# Patient Record
Sex: FEMALE | Race: WHITE | NOT HISPANIC OR LATINO | Employment: UNEMPLOYED | URBAN - METROPOLITAN AREA
[De-identification: names, ages, dates, MRNs, and addresses within clinical notes are randomized per-mention and may not be internally consistent; named-entity substitution may affect disease eponyms.]

---

## 2017-01-17 ENCOUNTER — TELEPHONE (OUTPATIENT)
Dept: PEDIATRICS | Facility: CLINIC | Age: 22
End: 2017-01-17

## 2017-01-17 NOTE — TELEPHONE ENCOUNTER
----- Message from Margaret La sent at 1/17/2017  1:42 PM CST -----  Contact: mom suman [phone] or [phone]  Call mom she needs to get the referral for neurology faxed to the office.      Returned moms call, and faxed referral to [facility].

## 2018-02-17 ENCOUNTER — HOSPITAL ENCOUNTER (INPATIENT)
Facility: HOSPITAL | Age: 23
LOS: 4 days | Discharge: HOME | DRG: 882 | End: 2018-02-21
Attending: PSYCHIATRY & NEUROLOGY | Admitting: PSYCHIATRY & NEUROLOGY
Payer: MEDICAID

## 2018-02-17 DIAGNOSIS — F42.9 OCD (OBSESSIVE COMPULSIVE DISORDER): ICD-10-CM

## 2018-02-17 DIAGNOSIS — F41.9 ANXIETY: ICD-10-CM

## 2018-02-17 DIAGNOSIS — F42.2 MIXED OBSESSIONAL THOUGHTS AND ACTS: ICD-10-CM

## 2018-02-17 PROCEDURE — 99222 1ST HOSP IP/OBS MODERATE 55: CPT | Mod: AF,HB,, | Performed by: PSYCHIATRY & NEUROLOGY

## 2018-02-17 PROCEDURE — 25000003 PHARM REV CODE 250: Performed by: PSYCHIATRY & NEUROLOGY

## 2018-02-17 PROCEDURE — 12400001 HC PSYCH SEMI-PRIVATE ROOM

## 2018-02-17 RX ORDER — CALCIUM CARBONATE 200(500)MG
500 TABLET,CHEWABLE ORAL DAILY PRN
Status: DISCONTINUED | OUTPATIENT
Start: 2018-02-17 | End: 2018-02-21 | Stop reason: HOSPADM

## 2018-02-17 RX ORDER — CLONAZEPAM 0.5 MG/1
0.5 TABLET ORAL 2 TIMES DAILY
COMMUNITY

## 2018-02-17 RX ORDER — HYDROXYZINE PAMOATE 50 MG/1
50 CAPSULE ORAL EVERY 6 HOURS PRN
Status: DISCONTINUED | OUTPATIENT
Start: 2018-02-17 | End: 2018-02-17

## 2018-02-17 RX ORDER — FLUVOXAMINE MALEATE 50 MG/1
50 TABLET ORAL 2 TIMES DAILY
Status: DISCONTINUED | OUTPATIENT
Start: 2018-02-17 | End: 2018-02-21 | Stop reason: HOSPADM

## 2018-02-17 RX ORDER — FLUVOXAMINE MALEATE 50 MG/1
50 TABLET ORAL NIGHTLY
Status: DISCONTINUED | OUTPATIENT
Start: 2018-02-17 | End: 2018-02-17

## 2018-02-17 RX ORDER — FLUVOXAMINE MALEATE 50 MG/1
50 TABLET ORAL NIGHTLY
Status: ON HOLD | COMMUNITY
End: 2018-02-21 | Stop reason: HOSPADM

## 2018-02-17 RX ORDER — CLONAZEPAM 0.5 MG/1
0.5 TABLET ORAL 2 TIMES DAILY
Status: DISCONTINUED | OUTPATIENT
Start: 2018-02-17 | End: 2018-02-21 | Stop reason: HOSPADM

## 2018-02-17 RX ORDER — OLANZAPINE 5 MG/1
5 TABLET, ORALLY DISINTEGRATING ORAL EVERY 8 HOURS PRN
Status: DISCONTINUED | OUTPATIENT
Start: 2018-02-17 | End: 2018-02-17

## 2018-02-17 RX ORDER — IBUPROFEN 600 MG/1
600 TABLET ORAL EVERY 6 HOURS PRN
Status: DISCONTINUED | OUTPATIENT
Start: 2018-02-17 | End: 2018-02-21 | Stop reason: HOSPADM

## 2018-02-17 RX ORDER — RISPERIDONE 0.25 MG/1
0.25 TABLET ORAL NIGHTLY
Status: DISCONTINUED | OUTPATIENT
Start: 2018-02-17 | End: 2018-02-18

## 2018-02-17 RX ORDER — OLANZAPINE 10 MG/2ML
5 INJECTION, POWDER, FOR SOLUTION INTRAMUSCULAR EVERY 8 HOURS PRN
Status: DISCONTINUED | OUTPATIENT
Start: 2018-02-17 | End: 2018-02-17

## 2018-02-17 RX ORDER — LORAZEPAM 0.5 MG/1
0.5 TABLET ORAL EVERY 12 HOURS PRN
Status: DISCONTINUED | OUTPATIENT
Start: 2018-02-17 | End: 2018-02-21 | Stop reason: HOSPADM

## 2018-02-17 RX ADMIN — RISPERIDONE 0.25 MG: 0.25 TABLET, FILM COATED ORAL at 08:02

## 2018-02-17 RX ADMIN — CLONAZEPAM 0.5 MG: 0.5 TABLET ORAL at 12:02

## 2018-02-17 RX ADMIN — FLUVOXAMINE MALEATE 50 MG: 50 TABLET, FILM COATED ORAL at 12:02

## 2018-02-17 RX ADMIN — FLUVOXAMINE MALEATE 50 MG: 50 TABLET, FILM COATED ORAL at 08:02

## 2018-02-17 RX ADMIN — CLONAZEPAM 0.5 MG: 0.5 TABLET ORAL at 08:02

## 2018-02-17 NOTE — NURSING
"Patient admitted to Acute Psychiatric Unit on a PEC. Arrived per [facility] from [facility] at approximately 0150. Per PEC, patient presented to emergency department with increased symptoms of anxiety and depression. Has been refusing to leave bed or eat and "having accidents on herself." Calm, cooperative on assessment. Verbalizes feeling OCD is getting worse and medications are not working. Person and belongings searched for contraband per NICKI Snider RN, CARMEN Granados RN and MIREYA Oshea MARYCARMEN. Educated on plan of care, unit rules, policies and procedures. Verbalized understanding. Modified visual contact initiated.   "

## 2018-02-17 NOTE — SUBJECTIVE & OBJECTIVE
Patient History           Medical as of 2/17/2018    Past Medical History: Patient provided no pertinent medical history.           Surgical as of 2/17/2018    Past Surgical History: Patient provided no pertinent surgical history.           Family as of 2/17/2018     Problem Relation Name Age of Onset Comments Source    Diabetes Mother -- -- -- Provider    Hypertension Father -- -- -- Provider    Migraines Father -- -- -- Provider    Seizures Maternal Grandmother -- -- -- Provider    Diabetes Paternal Grandfather -- -- -- Provider            Tobacco Use as of 2/17/2018     Smoking Status Smoking Start Date Smoking Quit Date Packs/day Years Used    Never Smoker -- -- -- --    Types Comments Smokeless Tobacco Status Smokeless Tobacco Quit Date Source    -- -- Unknown -- Provider            Alcohol Use as of 2/17/2018     Alcohol Use Drinks/Week Alcohol/Week Comments Source    No -- -- -- Provider            Drug Use as of 2/17/2018     Drug Use Types Frequency Comments Source    No -- -- -- Provider            Sexual Activity as of 2/17/2018     Sexually Active Birth Control Partners Comments Source    No -- -- -- Provider            Activities of Daily Living as of 2/17/2018    **None**           Social Documentation as of 2/17/2018    **None**           Occupational as of 2/17/2018    **None**           Socioeconomic as of 2/17/2018     Marital Status Spouse Name Number of Children Years Education Preferred Language Ethnicity Race Source    Single -- -- -- English /White White --         Pertinent History Q A Comments    as of 2/17/2018 Lives with      Place in Birth Order      Lives in      Number of Siblings      Raised by      Legal Involvement      Childhood Trauma      Criminal History of      Financial Status      Highest Level of Education      Does patient have access to a firearm?       Service      Primary Leisure Activity      Spirituality       History reviewed. No pertinent past  medical history.  History reviewed. No pertinent surgical history.  Family History     Problem Relation (Age of Onset)    Diabetes Mother, Paternal Grandfather    Hypertension Father    Migraines Father    Seizures Maternal Grandmother        Social History Main Topics    Smoking status: Never Smoker    Smokeless tobacco: Not on file    Alcohol use No    Drug use: No    Sexual activity: No     Review of patient's allergies indicates:  No Known Allergies    No current facility-administered medications on file prior to encounter.      Current Outpatient Prescriptions on File Prior to Encounter   Medication Sig    [DISCONTINUED] loratadine (CLARITIN) 10 mg tablet Take 1 tablet (10 mg total) by mouth once daily.     Psychotherapeutics     Start     Stop Route Frequency Ordered    02/17/18 2100  risperiDONE tablet 0.25 mg      -- Oral Nightly 02/17/18 1050    02/17/18 1200  fluvoxaMINE tablet 50 mg      -- Oral 2 times daily 02/17/18 1048    02/17/18 1152  LORazepam tablet 0.5 mg      -- Oral Every 12 hours PRN 02/17/18 1053        Review of Systems  Strengths and Liabilities: Strength: Patient has positive support network.    Objective:     Vital Signs (Most Recent):  Temp: 98.2 °F (36.8 °C) (02/17/18 0800)  Pulse: 86 (02/17/18 0800)  Resp: 18 (02/17/18 0800)  BP: 122/67 (02/17/18 0800) Vital Signs (24h Range):  Temp:  [98.2 °F (36.8 °C)-99 °F (37.2 °C)] 98.2 °F (36.8 °C)  Pulse:  [86-89] 86  Resp:  [16-18] 18  BP: (122-140)/(67-78) 122/67     Height: 5' (152.4 cm)  Weight: 66.4 kg (146 lb 6.2 oz)  Body mass index is 28.59 kg/m².    No intake or output data in the 24 hours ending 02/17/18 1756    Physical Exam    General: NAD  HEENT: EOMI  Respiratory: CTAB, unlabored breathing  Cardiovascular: RRR, no murmurs  Abdominal: abdomen soft, non-tender, non-distended  Extremities: no cyanosis or clubbing  Neurological: no involuntary movements observed, no focal neurological deficits, cranial nerves wnl:  I: No deficit  "smell  II: Visual fields full to confrontation  III,IV,VI: PERRLA, EOMI. No nystagmus   V: sensation to light touch intact throughout face  VII: Symmetric smile and brow raise  VIII: Able to hear finger rub bilaterally   IX,X: Symmetric elevation of the soft palate   XI: Able to shrug shoulders bilaterally   XII: tongue midline on protrusion    MSE  Level of Consciousness: alert, awake  Orientation: person, place, situation, time  Grooming: good  Psychomotor Behavior: no abnormalities noted  Speech: spontaneous, quiet, soft, normal rate  Language: fluent English  Mood: "scared"  Affect: mood congruent, anxious, child like  Thought Process: linear, goal directed  Associations: no loosening noted  Thought Content: denies SI/HI/AVH  Memory: remote and recent intact  Attention: attends to interview without distraction  Fund of Knowledge: appropriate for education level  Insight: fair  Judgment: appropriate for setting    Significant Labs:   Last 24 Hours:   Recent Lab Results     None      Labs from OSH reviewed and wnl; utox and urine pregnancy test negative    Significant Imaging: None  "

## 2018-02-17 NOTE — HPI
"Patient is a 23 y/o woman with past psychiatric h/o OCD who presented as a transfer from [facility] on 2/17/18. Per report, mother brought patient to the hospital as patient has been anxious to the point of not being able to leave the bed even to use the bathroom. Patient affirms this and says "I can't do anything..it's getting worse, my medicine isn't working." Says her issues are primary with germs, takes 1-2 showers/day and washes her hands a lot. H/o excessive checking behaviors when she was younger that she says have improved, denies repetitive thoughts or behaviors. Endorses increased depression, increased anxiety (including agoraphobia and social phobia), decreased appetite (1-2 meals/day) and poor sleep. Denies SI, h/o SA, key, panic attacks or AVH. Reluctant to discuss stressors, asks "can my mom tell you?" Alludes to some conflict in the home. Sees a psychiatrist/therapist monthly and journals regularly, also has a worker who comes to the home twice weekly. Denies prior psychiatric hospitalizations. Patient unsure of home medications.     Collateral: obtained by CM Julie Haase from patient's mother, Azucena (c:[phone] h:[phone]).   Mother reports that patient takes Clonazepam 0.5mg twice a day and Flovoxamine Maleate 50 mg daily. Mother has been making patient take medication so she has been compliant for at least 30 days.Mother states that patient was a preemie (6 weeks early) and has always been delayed. Reports no medical history other than severe migraines an a child. She has a long history of anxiety and fear of new situations. Has always been shy and suspicious of people and does not have friends. Since October of 2016, mother reports patient has been having "meltdowns."  She states that patient will sometimes sit in her room, not eat, and not talk other than to tell her mother that "things in her head keep repeating."  Mother states that patient has gotten so frustrated with this " "that she has screamed and punched herself in the leg. Mother emphasizes that this is not a self harming behavior. She has never been violent towards anyone.  She will cry.  She reported that patient will  her closet for hours.  The patient believes bad things will happen to people if she doesn't perform a certain ritual (explaine: touch door frame 3 times.) Sometimes mother has to bathe her because patient gets worried about germs and will ask mother over and over again if an objected  touched something  before it touched her (pt)  because she is worried "something bad will happen."  When she gets so focused on these types of questions, she can't perform her ADLs. She has these episodes almost daily and sometimes twice daily. She "really can't remember the last time she was doing well." Mother thinks patient's stressors are finishing high school and not knowing what to do. She does not do well with change. Mother also reports lots of stress in the home. Mother and father live in the home, along with patient's 82 year old grandfather who has dementia, three younger nieces, and brother and nephew live there off and on. She states her and the patient's father have been fighting and that he is a "jerk." Father has some understanding of patient's illness but will get angry with her when she has an episode. Patient is followed by a , Lindsay, at [facility] in [place] ([phone]). She was seeing a doctor there but mother states that patient wouldn't talk to him. She saw a nurse practitoner once. She is followed by ACT team (Keila) who comes to the house twice a week and a Ms. Snider who comes monthly for psychotherapy to work with patient on her anxiety. They have been following Dana for about 6-8 months. Mother suggested that we call Lindsay for more info about her psychiatric care. She has no previous inpatient admissions. Per mother, pt is very attached to her and dependant on " her. She said she would be available for a family meeting. She is supportive of patient. She asked about visiting hours and if she could bring the patient her stuffed animal and pillow. She also wanted to know if she should come visit at all because she was afraid that patient would have a bad meltdown when she left. Discussed above with treatment team and charge nurse. Per unit policy, patient will not be able to have her stuffed animal or pillow. Treatment teams feels patient may benefit more from a visit tomorrow after she's had a day to adjust to the unit and suggested that mom wait until Sunday to visit.     Past Psychiatric History:  Previous Medication Trials: pt unsure  Previous Psychiatric Hospitalizations: no   Previous Suicide Attempts: no   History of Violence: no  Outpatient Psychiatrist: ACT team Dr Sndier, counselor Lindsay    Social History:  Marital Status: single  Children: 0   Employment Status/Info: unemployed, helps out at home  Education: high school diploma/GED  Special Ed: yes  : did not assess  Spiritism: did not assess  Housing Status: lives with Christelle with dementia, mom, dad, 3 cousins (ages 6, 8, 9), brother and occasionally brother's young son  Hobbies/Leisure time: baking, playing games  History of phys/sexual abuse: did not assess; denies h/o trauma  Access to gun: no    Family Psychiatric History:   Family history of anxiety in [facility].     Substance Abuse History:  Recreational Drugs: no  Use of Alcohol: denied  Rehab History:no   Tobacco Use:no    Legal History:  Past Charges/Incarcerations:no   Pending charges:no     Neuro History:  H/o migraines, does not currently take meds. No h/o concussions, seizures.

## 2018-02-17 NOTE — NURSING
"Collateral obtained from patient's mother, Azucena (c:[phone] h:[phone]).    Mother reports that patient takes Clonazepam 0.5mg twice a day and Flovoxamine Maleate 50 mg daily. Mother has been making patient take medication so she has been compliant for at least 30 days.    Mother states that patient was a preemie (6 weeks early) and has always been delayed. Reports no medical history other than severe migraines an a child. She has a long history of anxiety and fear of new situations. Has always been shy and suspicious of people and does not have friends.     Since October of 2016, mother reports patient has been having "meltdowns."  She states that patient will sometimes sit in her room, not eat, and not talk other than to tell her mother that "things in her head keep repeating."  Mother states that patient has gotten so frustrated with this that she has screamed and punched herself in the leg. Mother emphasizes that this is not a self harming behavior. She has never been violent towards anyone.  She will cry.  She reported that patient will  her closet for hours.  The patient believes bad things will happen to people if she doesn't perform a certain ritual (explaine: touch door frame 3 times.) Sometimes mother has to bathe her because patient gets worried about germs and will ask mother over and over again if an objected  touched something  before it touched her (pt)  because she is worried "something bad will happen."  When she gets so focused on these types of questions, she can't perform her ADLs. She has these episodes almost daily and sometimes twice daily. She "really can't remember the last time she was doing well."    Mother thinks patient's stressors are finishing high school and not knowing what to do. She does not do well with change. Mother also reports lots of stress in the home. Mother and father live in the home, along with patient's 82 year old grandfather who has dementia, three " "younger nieces, and brother and nephew live there off and on. She states her and the patient's father have been fighting and that he is a "jerk." Father has some understanding of patient's illness but will get angry with her when she has an episode.    Patient is followed by a , Lindsay, at [facility] in [place] ([phone]). She was seeing a doctor there but mother states that patient wouldn't talk to him. She saw a nurse practitoner once. She is followed by ACT team (Keila) who comes to the house twice a week and a Ms. Snider who comes monthly for psychotherapy to work with patient on her anxiety. They have been following Dana for about 6-8 months. Mother suggested that we call Lindsay for more info about her psychiatric care. She has no previous inpatient admissions.    Per mother, pt is very attached to her and dependant on her. She said she would be available for a family meeting. She is supportive of patient. She asked about visiting hours and if she could bring the patient her stuffed animal and pillow. She also wanted to know if she should come visit at all because she was afraid that patient would have a bad meltdown when she left. Discussed above with treatment team and charge nurse. Per unit policy, patient will not be able to have her stuffed animal or pillow. Treatment teams feels patient may benefit more from a visit tomorrow after she's had a day to adjust to the unit and suggested that mom wait until Sunday to visit.   "

## 2018-02-17 NOTE — PROGRESS NOTES
02/17/18 0900 02/17/18 1000 02/17/18 1100   [facility] Group Therapy   Group Name Community Reintegration Mental Awareness Stress Management   Specific Interventions Current Events Cognitive Stimulation Training Guided Imagery/Relaxation   Participation Level None None None   Participation Quality Refused Refused Refused       02/17/18 1300 02/17/18 1330   [facility]   Group Name Medication Therapeutic Recreation   Specific Interventions --  (board games)   Participation Level None None   Participation Quality Refused Refused

## 2018-02-17 NOTE — PLAN OF CARE
02/17/18 1300   [facility] Group Therapy   Group Name Medication   Participation Level None   Participation Quality Refused

## 2018-02-18 PROCEDURE — 25000003 PHARM REV CODE 250: Performed by: PSYCHIATRY & NEUROLOGY

## 2018-02-18 PROCEDURE — 12400001 HC PSYCH SEMI-PRIVATE ROOM

## 2018-02-18 PROCEDURE — 99232 SBSQ HOSP IP/OBS MODERATE 35: CPT | Mod: AF,HB,, | Performed by: PSYCHIATRY & NEUROLOGY

## 2018-02-18 RX ORDER — RISPERIDONE 0.5 MG/1
0.5 TABLET ORAL NIGHTLY
Status: DISCONTINUED | OUTPATIENT
Start: 2018-02-18 | End: 2018-02-20

## 2018-02-18 RX ADMIN — FLUVOXAMINE MALEATE 50 MG: 50 TABLET, FILM COATED ORAL at 08:02

## 2018-02-18 RX ADMIN — CLONAZEPAM 0.5 MG: 0.5 TABLET ORAL at 08:02

## 2018-02-18 RX ADMIN — RISPERIDONE 0.5 MG: 0.5 TABLET ORAL at 08:02

## 2018-02-18 NOTE — PROGRESS NOTES
Staff report no ritualistic or compulsive behavior.  Staff observed patient not wash her hands before eating her pizza lunch.  Staff report patient talks more with female staff.

## 2018-02-18 NOTE — SUBJECTIVE & OBJECTIVE
"Interval History: Patient minimally cooperative with the interview.  Appeared very anxious.  States "I don't like being around a lot of people.  I want to be at home."  Not participating in groups. Childlike in her speech and responses.  Did not eat today.  States she doesn't want to eat around people she doesn't know.  Agreeable to drink gatorade.     Family History     Problem Relation (Age of Onset)    Diabetes Mother, Paternal Grandfather    Hypertension Father    Migraines Father    Seizures Maternal Grandmother        Social History Main Topics    Smoking status: Never Smoker    Smokeless tobacco: Not on file    Alcohol use No    Drug use: No    Sexual activity: No     Psychotherapeutics     Start     Stop Route Frequency Ordered    02/17/18 2100  risperiDONE tablet 0.25 mg      -- Oral Nightly 02/17/18 1050    02/17/18 1200  fluvoxaMINE tablet 50 mg      -- Oral 2 times daily 02/17/18 1048    02/17/18 1152  LORazepam tablet 0.5 mg      -- Oral Every 12 hours PRN 02/17/18 1053           Review of Systems  Objective:     Vital Signs (Most Recent):  Temp: 99 °F (37.2 °C) (02/18/18 0730)  Pulse: 84 (02/18/18 0730)  Resp: 16 (02/18/18 0730)  BP: 122/63 (02/18/18 0730) Vital Signs (24h Range):  Temp:  [99 °F (37.2 °C)-99.3 °F (37.4 °C)] 99 °F (37.2 °C)  Pulse:  [68-84] 84  Resp:  [16-18] 16  BP: (122-128)/(63-79) 122/63     Height: 5' (152.4 cm)  Weight: 66.4 kg (146 lb 6.2 oz)  Body mass index is 28.59 kg/m².    No intake or output data in the 24 hours ending 02/18/18 1053    Physical Exam      Mental Status Exam:  Appearance: normal weight, younger than stated age, casually dressed  Behavior/Cooperation:  reluctant to participate, restless and fidgety , eye contact minimal  Speech:  increased latency of response, soft  Language: uses words appropriately; NO aphasia or dysarthria  Mood: anxious  Affect:  congruent with mood and appropriate to situation/content   Thought Process: normal and logical  Thought " Content: normal, no suicidality, no homicidality, delusions, or paranoia  Level of Consciousness: Alert and Oriented x3  Memory:  Grossly Intact  Attention/concentration: appropriate for age/education.   Fund of Knowledge: appears adequate  Insight: Limited  Judgment: Limited    Significant Labs:   Last 24 Hours:   Recent Lab Results     None          Significant Imaging: None

## 2018-02-18 NOTE — PLAN OF CARE
Pt isolative in room this shift. Visibly anxious with intermittent crying spells. Pt did not attend groups or engage with peers. Maximum encouragement and reassurance provided in attempt to establish therapeutic rapport. Pt did comply with scheduled medications and education provided. Plan of care reviewed with pt. Will continue to provide safe therapeutic environment as pt works towards treatment goals.

## 2018-02-18 NOTE — PLAN OF CARE
Problem: Patient Care Overview (Adult)  Goal: Plan of Care Review  Outcome: Ongoing (interventions implemented as appropriate)  Understands need to be in hospital and take medication. Cooperative with medication administration.  Goal: Interdisciplinary Rounds/Family Conf  Outcome: Ongoing (interventions implemented as appropriate)  Patient participated with interdisciplinary rounds.    Problem: Mood Impairment (Anxiety Signs/Symptoms) (Adult)  Goal: Improved Mood Symptoms  Isolating in room most of day with minimal interaction with others.    Problem: Mood Impairment (Depressive Signs/Symptoms) (Adult)  Goal: Improved Mood Symptoms  Outcome: Ongoing (interventions implemented as appropriate)  Patient refusing to speak with her nurse.  Patient does not attend unit activities.    Problem: Behavior Regulation (Impulse Control) Impairment (Obsessive-Compulsive Symptoms) (Adult,Pediatric)  Goal: Improved Behavior Regulation and Impulse Control (Obsessive-Compulsive Symptoms)  Outcome: Ongoing (interventions implemented as appropriate)  No compulsive or ritualistic behavior reported.    Comments: Understands need to be in hospital and take medication. Cooperative with medication administration.  Patient participated with interdisciplinary rounds.  Isolating in room most of day with minimal interaction with others.  Patient refusing to speak with her nurse.  Patient does not attend unit activities.  No compulsive or ritualistic behavior reported.

## 2018-02-18 NOTE — PROGRESS NOTES
Patient keeping to herself in her room.  Patient did not get up for breakfast, but out for morning meds.  Patient only nodded her head when interacting with nurse.  Will continue to monitor.

## 2018-02-18 NOTE — HOSPITAL COURSE
2/18/2018: Isolating on the unit.  Continues to be timid, very anxious.  Not eating, but agreeable to drinking gatorade.   2/19/18 - isolative on unit, constricted, does not appear to be engaging in OCD behavior to great extent.  She is eating and attending to ADLs in hospital.    2/20/18 - remains isolative on unit, frightened and anxious.  PO intake and ADLs both improved.  Compliant with medication regimen.  2/21/18 day of discharge- Patient seen in treatment team this morning. She reports that she is happy that she is going to go home today. She reports that she has not had as many symptoms of OCD on the unit because she is distracted by being in a new place. She has been eating and sleeping on the unit and she took a shower yesterday. Patient was given education of her Luvox and the importance of remaining compliant on the medication after discharge.

## 2018-02-18 NOTE — PROGRESS NOTES
"[facility]  Psychiatry  Progress Note    Patient Name: Dana Storm  MRN: [id number]   Code Status: No Order  Admission Date: 2/17/2018  Hospital Length of Stay: 1 days  Expected Discharge Date:   Attending Physician: Gayathri Crow MD  Primary Care Provider: Jose Aguilar MD    Current Legal Status: [facility]    Patient information was obtained from patient.     Subjective:     Principal Problem:OCD (obsessive compulsive disorder)    Chief Complaint: Anxiety, severe OCD    HPI: Patient is a 23 y/o woman with past psychiatric h/o OCD who presented as a transfer from [facility] on 2/17/18. Per report, mother brought patient to the hospital as patient has been anxious to the point of not being able to leave the bed even to use the bathroom. Patient affirms this and says "I can't do anything..it's getting worse, my medicine isn't working." Says her issues are primary with germs, takes 1-2 showers/day and washes her hands a lot. H/o excessive checking behaviors when she was younger that she says have improved, denies repetitive thoughts or behaviors. Endorses increased depression, increased anxiety (including agoraphobia and social phobia), decreased appetite (1-2 meals/day) and poor sleep. Denies SI, h/o SA, key, panic attacks or AVH. Reluctant to discuss stressors, asks "can my mom tell you?" Alludes to some conflict in the home. Sees a psychiatrist/therapist monthly and journals regularly, also has a worker who comes to the home twice weekly. Denies prior psychiatric hospitalizations. Patient unsure of home medications.     Collateral: obtained by CM Julie Haase from patient's mother, Azucena (c:[phone] h:[phone]).   Mother reports that patient takes Clonazepam 0.5mg twice a day and Flovoxamine Maleate 50 mg daily. Mother has been making patient take medication so she has been compliant for at least 30 days.Mother states that patient was a preemie (6 weeks early) and has always " "been delayed. Reports no medical history other than severe migraines an a child. She has a long history of anxiety and fear of new situations. Has always been shy and suspicious of people and does not have friends. Since October of 2016, mother reports patient has been having "meltdowns."  She states that patient will sometimes sit in her room, not eat, and not talk other than to tell her mother that "things in her head keep repeating."  Mother states that patient has gotten so frustrated with this that she has screamed and punched herself in the leg. Mother emphasizes that this is not a self harming behavior. She has never been violent towards anyone.  She will cry.  She reported that patient will  her closet for hours.  The patient believes bad things will happen to people if she doesn't perform a certain ritual (explaine: touch door frame 3 times.) Sometimes mother has to bathe her because patient gets worried about germs and will ask mother over and over again if an objected  touched something  before it touched her (pt)  because she is worried "something bad will happen."  When she gets so focused on these types of questions, she can't perform her ADLs. She has these episodes almost daily and sometimes twice daily. She "really can't remember the last time she was doing well." Mother thinks patient's stressors are finishing high school and not knowing what to do. She does not do well with change. Mother also reports lots of stress in the home. Mother and father live in the home, along with patient's 82 year old grandfather who has dementia, three younger nieces, and brother and nephew live there off and on. She states her and the patient's father have been fighting and that he is a "jerk." Father has some understanding of patient's illness but will get angry with her when she has an episode. Patient is followed by a , Lindsay, at [facility] in [place] ([phone]). She was " seeing a doctor there but mother states that patient wouldn't talk to him. She saw a nurse practitoner once. She is followed by ACT team (Keila) who comes to the house twice a week and a Ms. Snider who comes monthly for psychotherapy to work with patient on her anxiety. They have been following Dana for about 6-8 months. Mother suggested that we call Lindsay for more info about her psychiatric care. She has no previous inpatient admissions. Per mother, pt is very attached to her and dependant on her. She said she would be available for a family meeting. She is supportive of patient. She asked about visiting hours and if she could bring the patient her stuffed animal and pillow. She also wanted to know if she should come visit at all because she was afraid that patient would have a bad meltdown when she left. Discussed above with treatment team and charge nurse. Per unit policy, patient will not be able to have her stuffed animal or pillow. Treatment teams feels patient may benefit more from a visit tomorrow after she's had a day to adjust to the unit and suggested that mom wait until Sunday to visit.     Past Psychiatric History:  Previous Medication Trials: pt unsure  Previous Psychiatric Hospitalizations: no   Previous Suicide Attempts: no   History of Violence: no  Outpatient Psychiatrist: ACT team Dr Snider, counselor Lindsay    Social History:  Marital Status: single  Children: 0   Employment Status/Info: unemployed, helps out at home  Education: high school diploma/GED  Special Ed: yes  : did not assess  Moravian: did not assess  Housing Status: lives with [facility] with dementia, mom, dad, 3 cousins (ages 6, 8, 9), brother and occasionally brother's young son  Hobbies/Leisure time: baking, playing games  History of phys/sexual abuse: did not assess; denies h/o trauma  Access to gun: no    Family Psychiatric History:   Family history of anxiety in [facility].     Substance Abuse History:  Recreational Drugs:  "no  Use of Alcohol: denied  Rehab History:no   Tobacco Use:no    Legal History:  Past Charges/Incarcerations:no   Pending charges:no     Neuro History:  H/o migraines, does not currently take meds. No h/o concussions, seizures.    Hospital Course: 2/18/2018: Isolating on the unit.  Continues to be timid, very anxious.  Not eating, but agreeable to drinking gatorade.     Interval History: Patient minimally cooperative with the interview.  Appeared very anxious.  States "I don't like being around a lot of people.  I want to be at home."  Not participating in groups. Childlike in her speech and responses.  Did not eat today.  States she doesn't want to eat around people she doesn't know.  Agreeable to drink gatorade.     Family History     Problem Relation (Age of Onset)    Diabetes Mother, Paternal Grandfather    Hypertension Father    Migraines Father    Seizures Maternal Grandmother        Social History Main Topics    Smoking status: Never Smoker    Smokeless tobacco: Not on file    Alcohol use No    Drug use: No    Sexual activity: No     Psychotherapeutics     Start     Stop Route Frequency Ordered    02/17/18 2100  risperiDONE tablet 0.25 mg      -- Oral Nightly 02/17/18 1050    02/17/18 1200  fluvoxaMINE tablet 50 mg      -- Oral 2 times daily 02/17/18 1048    02/17/18 1152  LORazepam tablet 0.5 mg      -- Oral Every 12 hours PRN 02/17/18 1053           Review of Systems  Objective:     Vital Signs (Most Recent):  Temp: 99 °F (37.2 °C) (02/18/18 0730)  Pulse: 84 (02/18/18 0730)  Resp: 16 (02/18/18 0730)  BP: 122/63 (02/18/18 0730) Vital Signs (24h Range):  Temp:  [99 °F (37.2 °C)-99.3 °F (37.4 °C)] 99 °F (37.2 °C)  Pulse:  [68-84] 84  Resp:  [16-18] 16  BP: (122-128)/(63-79) 122/63     Height: 5' (152.4 cm)  Weight: 66.4 kg (146 lb 6.2 oz)  Body mass index is 28.59 kg/m².    No intake or output data in the 24 hours ending 02/18/18 1053    Physical Exam      Mental Status Exam:  Appearance: normal weight, " younger than stated age, casually dressed  Behavior/Cooperation:  reluctant to participate, restless and fidgety , eye contact minimal  Speech:  increased latency of response, soft  Language: uses words appropriately; NO aphasia or dysarthria  Mood: anxious  Affect:  congruent with mood and appropriate to situation/content   Thought Process: normal and logical  Thought Content: normal, no suicidality, no homicidality, delusions, or paranoia  Level of Consciousness: Alert and Oriented x3  Memory:  Grossly Intact  Attention/concentration: appropriate for age/education.   Fund of Knowledge: appears adequate  Insight: Limited  Judgment: Limited    Significant Labs:   Last 24 Hours:   Recent Lab Results     None          Significant Imaging: None    Assessment/Plan:     * OCD (obsessive compulsive disorder)    - Continue PEC for grave disability  - Increase home Luvox from 50 mg qHS to BID  - Continue home Klonopin 0.5 mg daily PRN anxiety, will change to 0.5 mg BID scheduled.  Utox was negative for benzodiazepines despite mother reporting patient receives the medications regularly   - Increase Risperdal to 0.50 mg qHS   - Ativan 0.5 mg BID PRN anxiety  - Consider coordination of care with outpatient therapist/counselor.  Will need to contact [facility] for further collateral.   - Concern that patient is isolating in the bathroom.  Will place modified visual contact order to make sure patient comes out of the bathroom every 20 minutes.         Anxiety    Patient also endorses agoraphobia and social anxiety. See plan for OCD.             Need for Continued Hospitalization:   Requires ongoing hospitalization for stabilization of medications.    Anticipated Disposition: Home or Self Care     Total time:  15 with greater than 50% of this time spent in counseling and/or coordination of care.       Silvana Mcknight MD   Psychiatry  [facility]

## 2018-02-18 NOTE — ASSESSMENT & PLAN NOTE
- Continue PEC for grave disability  - Increase home Luvox from 50 mg qHS to BID  - Continue home Klonopin 0.5 mg daily PRN anxiety, will change to 0.5 mg BID scheduled.  Utox was negative for benzodiazepines despite mother reporting patient receives the medications regularly   - Increase Risperdal to 0.50 mg qHS   - Ativan 0.5 mg BID PRN anxiety  - Consider coordination of care with outpatient therapist/counselor.  Will need to contact [facility] for further collateral.   - Concern that patient is isolating in the bathroom.  Will place modified visual contact order to make sure patient comes out of the bathroom every 20 minutes.

## 2018-02-18 NOTE — H&P
"[facility]  Psychiatry  History & Physical    Patient Name: Dana Storm  MRN: [id number]   Code Status: No Order  Admission Date: 2/17/2018  Attending Physician: Dr Crow  Primary Care Provider: Jose Aguilar MD    Current Legal Status: [facility]    Patient information was obtained from patient, parent and ER records.     Subjective:     Principal Problem:OCD (obsessive compulsive disorder)    Chief Complaint:       HPI: Patient is a 23 y/o woman with past psychiatric h/o OCD who presented as a transfer from [facility] on 2/17/18. Per report, mother brought patient to the hospital as patient has been anxious to the point of not being able to leave the bed even to use the bathroom. Patient affirms this and says "I can't do anything..it's getting worse, my medicine isn't working." Says her issues are primary with germs, takes 1-2 showers/day and washes her hands a lot. H/o excessive checking behaviors when she was younger that she says have improved, denies repetitive thoughts or behaviors. Endorses increased depression, increased anxiety (including agoraphobia and social phobia), decreased appetite (1-2 meals/day) and poor sleep. Denies SI, h/o SA, key, panic attacks or AVH. Reluctant to discuss stressors, asks "can my mom tell you?" Alludes to some conflict in the home. Sees a psychiatrist/therapist monthly and journals regularly, also has a worker who comes to the home twice weekly. Denies prior psychiatric hospitalizations. Patient unsure of home medications.     Collateral: obtained by CM Julie Haase from patient's mother, Azucena (c:[phone] h:[phone]).   Mother reports that patient takes Clonazepam 0.5mg twice a day and Flovoxamine Maleate 50 mg daily. Mother has been making patient take medication so she has been compliant for at least 30 days.Mother states that patient was a preemie (6 weeks early) and has always been delayed. Reports no medical history other than severe " "migraines an a child. She has a long history of anxiety and fear of new situations. Has always been shy and suspicious of people and does not have friends. Since October of 2016, mother reports patient has been having "meltdowns."  She states that patient will sometimes sit in her room, not eat, and not talk other than to tell her mother that "things in her head keep repeating."  Mother states that patient has gotten so frustrated with this that she has screamed and punched herself in the leg. Mother emphasizes that this is not a self harming behavior. She has never been violent towards anyone.  She will cry.  She reported that patient will  her closet for hours.  The patient believes bad things will happen to people if she doesn't perform a certain ritual (explaine: touch door frame 3 times.) Sometimes mother has to bathe her because patient gets worried about germs and will ask mother over and over again if an objected  touched something  before it touched her (pt)  because she is worried "something bad will happen."  When she gets so focused on these types of questions, she can't perform her ADLs. She has these episodes almost daily and sometimes twice daily. She "really can't remember the last time she was doing well." Mother thinks patient's stressors are finishing high school and not knowing what to do. She does not do well with change. Mother also reports lots of stress in the home. Mother and father live in the home, along with patient's 82 year old grandfather who has dementia, three younger nieces, and brother and nephew live there off and on. She states her and the patient's father have been fighting and that he is a "jerk." Father has some understanding of patient's illness but will get angry with her when she has an episode. Patient is followed by a , Lindsay, at [facility] in [place] ([phone]). She was seeing a doctor there but mother states that patient " wouldn't talk to him. She saw a nurse practitoner once. She is followed by ACT team (Keila) who comes to the house twice a week and a Ms. Snider who comes monthly for psychotherapy to work with patient on her anxiety. They have been following Dana for about 6-8 months. Mother suggested that we call Lindsay for more info about her psychiatric care. She has no previous inpatient admissions. Per mother, pt is very attached to her and dependant on her. She said she would be available for a family meeting. She is supportive of patient. She asked about visiting hours and if she could bring the patient her stuffed animal and pillow. She also wanted to know if she should come visit at all because she was afraid that patient would have a bad meltdown when she left. Discussed above with treatment team and charge nurse. Per unit policy, patient will not be able to have her stuffed animal or pillow. Treatment teams feels patient may benefit more from a visit tomorrow after she's had a day to adjust to the unit and suggested that mom wait until Sunday to visit.     Past Psychiatric History:  Previous Medication Trials: pt unsure  Previous Psychiatric Hospitalizations: no   Previous Suicide Attempts: no   History of Violence: no  Outpatient Psychiatrist: ACT team Dr Snider, counselor Lindsay    Social History:  Marital Status: single  Children: 0   Employment Status/Info: unemployed, helps out at home  Education: high school diploma/GED  Special Ed: yes  : did not assess  Islam: did not assess  Housing Status: lives with [facility] with dementia, mom, dad, 3 cousins (ages 6, 8, 9), brother and occasionally brother's young son  Hobbies/Leisure time: baking, playing games  History of phys/sexual abuse: did not assess; denies h/o trauma  Access to gun: no    Family Psychiatric History:   Family history of anxiety in [place].     Substance Abuse History:  Recreational Drugs: no  Use of Alcohol: denied  Rehab History:no   Tobacco  Use:no    Legal History:  Past Charges/Incarcerations:no   Pending charges:no     Neuro History:  H/o migraines, does not currently take meds. No h/o concussions, seizures.         Patient History           Medical as of 2/17/2018    Past Medical History: Patient provided no pertinent medical history.           Surgical as of 2/17/2018    Past Surgical History: Patient provided no pertinent surgical history.           Family as of 2/17/2018     Problem Relation Name Age of Onset Comments Source    Diabetes Mother -- -- -- Provider    Hypertension Father -- -- -- Provider    Migraines Father -- -- -- Provider    Seizures Maternal Grandmother -- -- -- Provider    Diabetes Paternal Grandfather -- -- -- Provider            Tobacco Use as of 2/17/2018     Smoking Status Smoking Start Date Smoking Quit Date Packs/day Years Used    Never Smoker -- -- -- --    Types Comments Smokeless Tobacco Status Smokeless Tobacco Quit Date Source    -- -- Unknown -- Provider            Alcohol Use as of 2/17/2018     Alcohol Use Drinks/Week Alcohol/Week Comments Source    No -- -- -- Provider            Drug Use as of 2/17/2018     Drug Use Types Frequency Comments Source    No -- -- -- Provider            Sexual Activity as of 2/17/2018     Sexually Active Birth Control Partners Comments Source    No -- -- -- Provider            Activities of Daily Living as of 2/17/2018    **None**           Social Documentation as of 2/17/2018    **None**           Occupational as of 2/17/2018    **None**           Socioeconomic as of 2/17/2018     Marital Status Spouse Name Number of Children Years Education Preferred Language Ethnicity Race Source    Single -- -- -- English /White White --         Pertinent History Q A Comments    as of 2/17/2018 Lives with      Place in Birth Order      Lives in      Number of Siblings      Raised by      Legal Involvement      Childhood Trauma      Criminal History of      Financial Status      Highest  Level of Education      Does patient have access to a firearm?       Service      Primary Leisure Activity      Spirituality       History reviewed. No pertinent past medical history.  History reviewed. No pertinent surgical history.  Family History     Problem Relation (Age of Onset)    Diabetes Mother, Paternal Grandfather    Hypertension Father    Migraines Father    Seizures Maternal Grandmother        Social History Main Topics    Smoking status: Never Smoker    Smokeless tobacco: Not on file    Alcohol use No    Drug use: No    Sexual activity: No     Review of patient's allergies indicates:  No Known Allergies    No current facility-administered medications on file prior to encounter.      Current Outpatient Prescriptions on File Prior to Encounter   Medication Sig    [DISCONTINUED] loratadine (CLARITIN) 10 mg tablet Take 1 tablet (10 mg total) by mouth once daily.     Psychotherapeutics     Start     Stop Route Frequency Ordered    02/17/18 2100  risperiDONE tablet 0.25 mg      -- Oral Nightly 02/17/18 1050    02/17/18 1200  fluvoxaMINE tablet 50 mg      -- Oral 2 times daily 02/17/18 1048    02/17/18 1152  LORazepam tablet 0.5 mg      -- Oral Every 12 hours PRN 02/17/18 1053        Review of Systems  Strengths and Liabilities: Strength: Patient has positive support network.    Objective:     Vital Signs (Most Recent):  Temp: 98.2 °F (36.8 °C) (02/17/18 0800)  Pulse: 86 (02/17/18 0800)  Resp: 18 (02/17/18 0800)  BP: 122/67 (02/17/18 0800) Vital Signs (24h Range):  Temp:  [98.2 °F (36.8 °C)-99 °F (37.2 °C)] 98.2 °F (36.8 °C)  Pulse:  [86-89] 86  Resp:  [16-18] 18  BP: (122-140)/(67-78) 122/67     Height: 5' (152.4 cm)  Weight: 66.4 kg (146 lb 6.2 oz)  Body mass index is 28.59 kg/m².    No intake or output data in the 24 hours ending 02/17/18 1756    Physical Exam    General: NAD  HEENT: EOMI  Respiratory: CTAB, unlabored breathing  Cardiovascular: RRR, no murmurs  Abdominal: abdomen soft,  "non-tender, non-distended  Extremities: no cyanosis or clubbing  Neurological: no involuntary movements observed, no focal neurological deficits, cranial nerves wnl:  I: No deficit smell  II: Visual fields full to confrontation  III,IV,VI: PERRLA, EOMI. No nystagmus   V: sensation to light touch intact throughout face  VII: Symmetric smile and brow raise  VIII: Able to hear finger rub bilaterally   IX,X: Symmetric elevation of the soft palate   XI: Able to shrug shoulders bilaterally   XII: tongue midline on protrusion    MSE  Level of Consciousness: alert, awake  Orientation: person, place, situation, time  Grooming: good  Psychomotor Behavior: no abnormalities noted  Speech: spontaneous, quiet, soft, normal rate  Language: fluent English  Mood: "scared"  Affect: mood congruent, anxious, child like  Thought Process: linear, goal directed  Associations: no loosening noted  Thought Content: denies SI/HI/AVH  Memory: remote and recent intact  Attention: attends to interview without distraction  Fund of Knowledge: appropriate for education level  Insight: fair  Judgment: appropriate for setting    Significant Labs:   Last 24 Hours:   Recent Lab Results     None      Labs from OSH reviewed and wnl; utox and urine pregnancy test negative    Significant Imaging: None    Assessment/Plan:     * OCD (obsessive compulsive disorder)    - Continue PEC for grave disability  - Increase home Luvox from 50 mg qHS to BID  - Continue home Klonopin 0.5 mg daily PRN anxiety, will change to 0.5 mg BID scheduled  - Start Risperdal 0.25 mg qHS   - Ativan 0.5 mg BID PRN anxiety  - Consider coordination of care with outpatient therapist/counselor        Anxiety    Patient also endorses agoraphobia and social anxiety. See plan for OCD.           Estimated Discharge Date:   Initial Discharge Plan: Home    Prognosis: Fair    Need for Continued Hospitalization:   Requires ongoing hospitalization for stabilization of medications.    Total Time: " 50 with greater than 50% of time spent in counseling and/or coordination of care.     Concha Mckenzie MD   Psychiatry  [facility]

## 2018-02-18 NOTE — PROGRESS NOTES
02/18/18 0900 02/18/18 1000 02/18/18 1100   [facility] Group Therapy   Group Name Community Reintegration Mental Awareness Stress Management   Specific Interventions Current Events (name game) Guided Imagery/Relaxation   Participation Level None None None   Participation Quality Refused Refused Refused       02/18/18 1200 02/18/18 1330   [facility] Group Therapy   Group Name Medication Therapeutic Recreation   Specific Interventions --  (art room)   Participation Level None None   Participation Quality Refused Refused

## 2018-02-18 NOTE — ASSESSMENT & PLAN NOTE
- Continue PEC for grave disability  - Increase home Luvox from 50 mg qHS to BID  - Continue home Klonopin 0.5 mg daily PRN anxiety, will change to 0.5 mg BID scheduled  - Start Risperdal 0.25 mg qHS   - Ativan 0.5 mg BID PRN anxiety  - Consider coordination of care with outpatient therapist/counselor   Message   call, lyme titer negative     Verified Results  (1) LYME ANTIBODY PROFILE [facility] TO [place] [id number] 09:49AM [facility] Order Number: [id number]     Test Name Result Flag Reference   LYME IGG 0 04  0 00-0 79   NEGATIVE(0 00-0 79)-Absence of detectable Borrelia IgG Antibodies  A negative result does not exclude the possibility of Borrelia infection  If early Lyme disease is suspected,a second sample should be collected & tested 4 weeks after initial testing  LYME IGM 0 31  0 00-0 79   NEGATIVE (0 00-0 79)-Absence of detectable Borrelia IgM antibodies  A negative result does not exclude the possibility of Borrelia infection  If early lyme disease is suspected, a second sample should be collected & tested 4 weeks after initial testing

## 2018-02-18 NOTE — ASSESSMENT & PLAN NOTE
- Continue PEC for grave disability  - Increase home Luvox from 50 mg qHS to BID  - Continue home Klonopin 0.5 mg daily PRN anxiety, will change to 0.5 mg BID scheduled  - Ativan 0.5 mg BID PRN anxiety  - Consider coordination of care with outpatient therapist/counselor

## 2018-02-18 NOTE — PLAN OF CARE
Problem: Patient Care Overview (Adult)  Goal: Plan of Care Review  Outcome: Ongoing (interventions implemented as appropriate)  Observed awake in her room isolative and withdrawn. Denied SI/HI, A/V hallucinattions. Took scheduled medications questioning each one. Educated on the reasons medications were prescribed,along with the dosages. Pt.given opportunity for questions. Appeared asleep throughout the night as noted during frequent rounds. Free from falls/injury. Safety maintained. Continue to monitor.

## 2018-02-18 NOTE — PLAN OF CARE
02/18/18 1200   [facility] Group Therapy   Group Name Medication   Participation Level None   Participation Quality Refused

## 2018-02-19 PROCEDURE — 25000003 PHARM REV CODE 250: Performed by: PSYCHIATRY & NEUROLOGY

## 2018-02-19 PROCEDURE — 90833 PSYTX W PT W E/M 30 MIN: CPT | Mod: AF,HA,, | Performed by: PSYCHIATRY & NEUROLOGY

## 2018-02-19 PROCEDURE — 99232 SBSQ HOSP IP/OBS MODERATE 35: CPT | Mod: AF,HA,, | Performed by: PSYCHIATRY & NEUROLOGY

## 2018-02-19 PROCEDURE — 90853 GROUP PSYCHOTHERAPY: CPT | Mod: HP,HA,, | Performed by: PSYCHOLOGIST

## 2018-02-19 PROCEDURE — 12400001 HC PSYCH SEMI-PRIVATE ROOM

## 2018-02-19 RX ADMIN — RISPERIDONE 0.5 MG: 0.5 TABLET ORAL at 08:02

## 2018-02-19 RX ADMIN — FLUVOXAMINE MALEATE 50 MG: 50 TABLET, FILM COATED ORAL at 08:02

## 2018-02-19 RX ADMIN — CLONAZEPAM 0.5 MG: 0.5 TABLET ORAL at 08:02

## 2018-02-19 NOTE — PLAN OF CARE
Problem: Patient Care Overview (Adult)  Goal: Plan of Care Review  Outcome: Ongoing (interventions implemented as appropriate)  Patient quiet, guarded and isolative. Minimal interaction with staff. Med compliant. Denies SI/HI/AVH. Refused to attend any unit activities. Will continue to monitor patient and encourage verbalization of feelings.
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
## 2018-02-19 NOTE — PROGRESS NOTES
02/18/18 2000   [facility] Group Therapy   Group Name Community Reintegration   Specific Interventions Other (see comments)  (wrap up)   Participation Level None   Participation Quality Refused

## 2018-02-19 NOTE — PLAN OF CARE
Problem: Patient Care Overview (Adult)  Goal: Plan of Care Review  Outcome: Ongoing (interventions implemented as appropriate)  Calm, cooperative. Pleasant. Remains anxious, depressed. Isolated to room. Presents with guarded affect. Minimal interaction with peers and staff. Verbalizes feeling sad and wanting to go home. Continues with poor appetite. Medication compliant. Modified visual contact maintained per MD orders.

## 2018-02-19 NOTE — PROGRESS NOTES
02/19/18 [address] [place] [address] Group Therapy   Group Name Therapeutic Recreation   Participation Level None   Participation Quality Refused;Withdrawn   Affect/Mood Display Anxious   refused all activities after maximum encouragement

## 2018-02-19 NOTE — ASSESSMENT & PLAN NOTE
Patient reports attenuation of OCD symptoms on unit - states new environments are distracting    - continue Luvox 50mg bid, an increase from home dose of 50mg bedtime  - continue Klonopin 0.5 mg bid, an increase from home dose of 0.5mg daily PRN anxiety.  Utox was negative for benzodiazepines despite mother reporting patient receives the medications regularly   - continue Risperdal 0.50 mg qHS   - continue Ativan 0.5 mg BID PRN anxiety  - coordinate care with outpt treatment team

## 2018-02-19 NOTE — SUBJECTIVE & OBJECTIVE
"Interval History: patient states she is scared, and appears frightened in treatment team.  She states she is in the hospital because she has OCD and it got really bad and her meds weren't working.  She reports waxing and waning course.  She reports obsessions about germs, washing hands and taking showers.  She states she wasn't eating much before admit - "I wasn't hungry".  She states her eating has improved in the hospital.  She reports being frightened in the hospital around people, but denies paranoia.  She reports depressed mood but denies SI/HI.      PMHx  Past Medical History Reviewed    ROS  GI: no N/V  Neuro: no dizziness or headaches, no tremor        EXAMINATION    VITALS   Vitals:    02/19/18 0752   BP: 126/78   Pulse: 110   Resp: 16   Temp: 97.8 °F (36.6 °C)       CONSTITUTIONAL  General Appearance: stated age, casually dressed, poor eye contact, appears frightened    MUSCULOSKELETAL  Muscle Strength and Tone: no weakness or spasticity  Abnormal Involuntary Movements: no tremor, no akathisia, no evidence of tardive dyskinesia  Gait and Station: ambulates without assistance    PSYCHIATRIC   Level of Consciousness: alert  Orientation: to person, place, time  Grooming: intact, neat  Psychomotor Behavior: +retardation  Speech: decreased spontaneity but answers questions  Language: fluent english  Mood: frightened  Affect: constricted, childlike, appears frightened  Thought Process: linear  Associations: intact, no loosening of associations  Thought Content: denies paranoia, denies SI/HI  Memory: grossly intact  Attention: not distractible  Fund of Knowledge: intact  Insight: intact  Judgment: impaired      Family History     Problem Relation (Age of Onset)    Diabetes Mother, Paternal Grandfather    Hypertension Father    Migraines Father    Seizures Maternal Grandmother        Social History Main Topics    Smoking status: Never Smoker    Smokeless tobacco: Not on file    Alcohol use No    Drug use: No "    Sexual activity: No     Psychotherapeutics     Start     Stop Route Frequency Ordered    02/18/18 2100  risperiDONE tablet 0.5 mg      -- Oral Nightly 02/18/18 1102    02/17/18 1200  fluvoxaMINE tablet 50 mg      -- Oral 2 times daily 02/17/18 1048    02/17/18 1152  LORazepam tablet 0.5 mg      -- Oral Every 12 hours PRN 02/17/18 1053           Review of Systems  Objective:     Vital Signs (Most Recent):  Temp: 97.8 °F (36.6 °C) (02/19/18 0752)  Pulse: 110 (02/19/18 0752)  Resp: 16 (02/19/18 0752)  BP: 126/78 (02/19/18 0752) Vital Signs (24h Range):  Temp:  [97.8 °F (36.6 °C)-99.1 °F (37.3 °C)] 97.8 °F (36.6 °C)  Pulse:  [] 110  Resp:  [16] 16  BP: (126-142)/(70-78) 126/78     Height: 5' (152.4 cm)  Weight: 66.4 kg (146 lb 6.2 oz)  Body mass index is 28.59 kg/m².    No intake or output data in the 24 hours ending 02/19/18 0851    Physical Exam     Significant Labs:   Last 24 Hours:   Recent Lab Results     None          Significant Imaging: None

## 2018-02-19 NOTE — PROGRESS NOTES
"Group Psychotherapy (PhD/LCSW)    Site: [facility]    Clinical status of patient: Inpatient    Date: 2/19/2018    Group Focus: Life Skills    Length of service: 18717 - 35-40 minutes    Number of patients in attendance: 7    Referred by: Acute Psychiatry Unit Treatment Team    Target symptoms: Anxiety and obsessive/compulsive behavior     Patient's response to treatment: Pt did not actively participate     Progress toward goals: Minimal progress    Interval History: Pt appeared distraught during group. She listened but did not actively participate on the discussion of anger management. When asked if anger was ever a problem for her, she shook her head to indicate "no."     Diagnosis: OCD; Anxiety, unspecified    Plan: Continue treatment on [facility]        "

## 2018-02-20 PROCEDURE — 25000003 PHARM REV CODE 250: Performed by: PSYCHIATRY & NEUROLOGY

## 2018-02-20 PROCEDURE — 97165 OT EVAL LOW COMPLEX 30 MIN: CPT

## 2018-02-20 PROCEDURE — 90836 PSYTX W PT W E/M 45 MIN: CPT | Mod: AF,HA,, | Performed by: PSYCHIATRY & NEUROLOGY

## 2018-02-20 PROCEDURE — 12400001 HC PSYCH SEMI-PRIVATE ROOM

## 2018-02-20 PROCEDURE — 99233 SBSQ HOSP IP/OBS HIGH 50: CPT | Mod: AF,HA,, | Performed by: PSYCHIATRY & NEUROLOGY

## 2018-02-20 RX ADMIN — CLONAZEPAM 0.5 MG: 0.5 TABLET ORAL at 09:02

## 2018-02-20 RX ADMIN — CLONAZEPAM 0.5 MG: 0.5 TABLET ORAL at 08:02

## 2018-02-20 RX ADMIN — FLUVOXAMINE MALEATE 50 MG: 50 TABLET, FILM COATED ORAL at 09:02

## 2018-02-20 RX ADMIN — FLUVOXAMINE MALEATE 50 MG: 50 TABLET, FILM COATED ORAL at 08:02
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
## 2018-02-20 NOTE — ASSESSMENT & PLAN NOTE
Patient with OCD, with significant functional impact, admitted for stabilization and improvement of function.    She has had scant pharmacologic treatment to date and it is expected that her symptoms will improve with more aggressive pharmacologic management.      She is on Luvox for past month but at low dose - this is first trial of SSRI other than a day or two of zoloft in past.  We have titrated Luvox on unit to 50mg bid, but I would expect that further titration will be needed outside hospital.  However we need to give the dose adjustment 2-4 weeks and then reassess.  Luvox can be titrated up to 300mg total daily dose if need be.    Given she has had inadequate trial of serotonin based medication, would not yet add on atypical neuroleptic.  Will therefore discontinue risperdal.  We can restart risperdal in future if monotherapy with SSRI inadequate for symptom control.

## 2018-02-20 NOTE — MEDICAL/APP STUDENT
"Subjective  During 1-1 interview pt appeared more comfortable, had much improved eye contact and spoke in a higher volume. Pt found to be smiley and upbeat when discussing her mother's visit yesterday and the prospect of seeing her again today. Otherwise pt again noted that she does not like change and so this new environment is making her scared. Pt also reiterated that her OCD symptoms are attenuated as she distracted by new environment.   Pt states that she was tested for Asperger Syndrome in March 2017. Results were negative but pt believes her test results are inaccurate (she believes she has Aspergers) because she felt pressured and quite nervous during the test.     PMHx  Past Medical History Reviewed    ROS  Not assessed today     Examination:  CONSTITUTIONAL  General Appearance: dressed neatly    MUSCULOSKELETAL  Muscle Strength and Tone: normal  Abnormal Involuntary Movements: none  Gait and Station: normal    PSYCHIATRIC   Level of Consciousness: alert and awake  Orientation: oriented to person, place, time, and situation   Grooming: neat  Psychomotor Behavior: fidgeting with hands during some of interview   Speech: soft tone, normal rate, rhythm, vocabulary appropriate for age  Language: no dysarthria or aphasia  Mood: "better when my momma is here"  Affect: eye contact much improved from during rounds, reactive   Thought Process: linear   Associations: none  Thought Content: denies SI/HI/AVH  Memory: 3/3 immediate recall; 3/3 recall 5 min later   Attention: 5/5 "world" ; 5/5' "dlrow"  Fund of Knowledge: intact  Insight: intact  Judgment: impaired    Family History   Problem Relation Age of Onset    Diabetes Mother     Hypertension Father     Migraines Father     Seizures Maternal Grandmother     Diabetes Paternal Grandfather      Social History     Social History    Marital status: Single     Spouse name: N/A    Number of children: N/A    Years of education: N/A     Occupational History    Not " on file.     Social History Main Topics    Smoking status: Never Smoker    Smokeless tobacco: Not on file    Alcohol use No    Drug use: No    Sexual activity: No     Other Topics Concern    Not on file     Social History Narrative    No narrative on file     Psychotherapeutics     Start     Stop Route Frequency Ordered    02/18/18 2100  risperiDONE tablet 0.5 mg      -- Oral Nightly 02/18/18 1102    02/17/18 1200  fluvoxaMINE tablet 50 mg      -- Oral 2 times daily 02/17/18 1048    02/17/18 1152  LORazepam tablet 0.5 mg      -- Oral Every 12 hours PRN 02/17/18 1053        Objective  Vitals:  Vitals:    02/19/18 1915   BP: 133/76   Pulse: 75   Resp: 16   Temp: 99.5 °F (37.5 °C)     Vital Sign Min/Max (last 24 hours)     Value Min Max   Temp 97.8 °F (36.6 °C) 99.5 °F (37.5 °C)   Pulse 75 110   Resp 16 16   BP: Systolic 126 133   BP: Diastolic 76 78     Height: 5' (152.4 cm)  Weight: 66.4 kg (146 lb 6.2 oz)  Body mass index is 28.59 kg/m².     No intake or output data in the 24 hours ending 02/19/18 0851     Significant Labs:   Last 24 Hours:       Recent Lab Results      None          Significant Imaging: None      Assessment:   OCD (obsessive compulsive disorder)      Plan:   Patient reports attenuation of OCD symptoms on unit - states new environments are distracting     - continue Luvox 50mg bid, an increase from home dose of 50mg bedtime  - continue Klonopin 0.5 mg bid, an increase from home dose of 0.5mg daily PRN anxiety.  Utox was negative for benzodiazepines despite mother reporting patient receives the medications regularly   - continue Risperdal 0.50 mg qHS   - continue Ativan 0.5 mg BID PRN anxiety  - coordinate care with outpt treatment team

## 2018-02-20 NOTE — PROGRESS NOTES
"[facility]  Psychiatry  Progress Note    Patient Name: Dana Storm  MRN: [id number]   Code Status: No Order  Admission Date: 2/17/2018  Hospital Length of Stay: 3 days  Expected Discharge Date:   Attending Physician: Porter Enriquez MD  Primary Care Provider: Jose Aguilar MD    [place] [facility]    Patient information was obtained from patient, parent, outpt treatment team and ER records.     Subjective:     Principal Problem:OCD (obsessive compulsive disorder)    Chief Complaint: OCD, anxiety, grave disability    HPI: Patient is a 21 y/o woman with past psychiatric h/o OCD who presented as a transfer from [facility] on 2/17/18. Per report, mother brought patient to the hospital as patient has been anxious to the point of not being able to leave the bed even to use the bathroom. Patient affirms this and says "I can't do anything..it's getting worse, my medicine isn't working." Says her issues are primary with germs, takes 1-2 showers/day and washes her hands a lot. H/o excessive checking behaviors when she was younger that she says have improved, denies repetitive thoughts or behaviors. Endorses increased depression, increased anxiety (including agoraphobia and social phobia), decreased appetite (1-2 meals/day) and poor sleep. Denies SI, h/o SA, key, panic attacks or AVH. Reluctant to discuss stressors, asks "can my mom tell you?" Alludes to some conflict in the home. Sees a psychiatrist/therapist monthly and journals regularly, also has a worker who comes to the home twice weekly. Denies prior psychiatric hospitalizations. Patient unsure of home medications.     Collateral: obtained by CM Julie Haase from patient's mother, Azucena (c:[phone] h:[phone]).   Mother reports that patient takes Clonazepam 0.5mg twice a day and Flovoxamine Maleate 50 mg daily. Mother has been making patient take medication so she has been compliant for at least 30 days.Mother " "states that patient was a preemie (6 weeks early) and has always been delayed. Reports no medical history other than severe migraines an a child. She has a long history of anxiety and fear of new situations. Has always been shy and suspicious of people and does not have friends. Since October of 2016, mother reports patient has been having "meltdowns."  She states that patient will sometimes sit in her room, not eat, and not talk other than to tell her mother that "things in her head keep repeating."  Mother states that patient has gotten so frustrated with this that she has screamed and punched herself in the leg. Mother emphasizes that this is not a self harming behavior. She has never been violent towards anyone.  She will cry.  She reported that patient will  her closet for hours.  The patient believes bad things will happen to people if she doesn't perform a certain ritual (explaine: touch door frame 3 times.) Sometimes mother has to bathe her because patient gets worried about germs and will ask mother over and over again if an objected  touched something  before it touched her (pt)  because she is worried "something bad will happen."  When she gets so focused on these types of questions, she can't perform her ADLs. She has these episodes almost daily and sometimes twice daily. She "really can't remember the last time she was doing well." Mother thinks patient's stressors are finishing high school and not knowing what to do. She does not do well with change. Mother also reports lots of stress in the home. Mother and father live in the home, along with patient's 82 year old grandfather who has dementia, three younger nieces, and brother and nephew live there off and on. She states her and the patient's father have been fighting and that he is a "jerk." Father has some understanding of patient's illness but will get angry with her when she has an episode. Patient is followed by a , Lindsay, " at [facility] in [place] ([phone]). She was seeing a doctor there but mother states that patient wouldn't talk to him. She saw a nurse practitoner once. She is followed by ACT team (Keila) who comes to the house twice a week and a Ms. Snider who comes monthly for psychotherapy to work with patient on her anxiety. They have been following Dana for about 6-8 months. Mother suggested that we call Lindsay for more info about her psychiatric care. She has no previous inpatient admissions. Per mother, pt is very attached to her and dependant on her. She said she would be available for a family meeting. She is supportive of patient. She asked about visiting hours and if she could bring the patient her stuffed animal and pillow. She also wanted to know if she should come visit at all because she was afraid that patient would have a bad meltdown when she left. Discussed above with treatment team and charge nurse. Per unit policy, patient will not be able to have her stuffed animal or pillow. Treatment teams feels patient may benefit more from a visit tomorrow after she's had a day to adjust to the unit and suggested that mom wait until Sunday to visit.     Past Psychiatric History:  Previous Medication Trials: pt unsure  Previous Psychiatric Hospitalizations: no   Previous Suicide Attempts: no   History of Violence: no  Outpatient Psychiatrist: ACT team Dr Snider, counselor Lindsay    Social History:  Marital Status: single  Children: 0   Employment Status/Info: unemployed, helps out at home  Education: high school diploma/GED  Special Ed: yes  : did not assess  Tenriism: did not assess  Housing Status: lives with [place] with dementia, mom, dad, 3 cousins (ages 6, 8, 9), brother and occasionally brother's young son  Hobbies/Leisure time: baking, playing games  History of phys/sexual abuse: did not assess; denies h/o trauma  Access to gun: no    Family Psychiatric History:   Family history of  "anxiety in [place].     Substance Abuse History:  Recreational Drugs: no  Use of Alcohol: denied  Rehab History:no   Tobacco Use:no    Legal History:  Past Charges/Incarcerations:no   Pending charges:no     Neuro History:  H/o migraines, does not currently take meds. No h/o concussions, seizures.    Hospital Course: 2/18/2018: Isolating on the unit.  Continues to be timid, very anxious.  Not eating, but agreeable to drinking gatorade.   2/19/18 - isolative on unit, constricted, does not appear to be engaging in OCD behavior to great extent.  She is eating and attending to ADLs in hospital.    2/20/18 - remains isolative on unit, frightened and anxious.  PO intake and ADLs both improved.  Compliant with medication regimen.    Interval History: patient remains isolated on unit.  She remains frightened, childlike, anxious.  She continues to report attenuation of OCD behaviors on unit.  She denies SI.  She is noted to have improved PO intake and ADLs on unit.  She is compliant with med regimen on unit - tolerating well.  Family meeting today with mother.      PMHx  Past Medical History Reviewed    ROS  GI: denies N/V/D  Musculoskeletal: denies pain      EXAMINATION    VITALS   Vitals:    02/20/18 0800   BP: 120/79   Pulse: 94   Resp: 17   Temp: 98.9 °F (37.2 °C)       CONSTITUTIONAL  General Appearance: stated age, casually dressed    MUSCULOSKELETAL  Muscle Strength and Tone: no weakness or spasticity  Abnormal Involuntary Movements: no tremor or akathisia, no evidence of tardive dyskinesia  Gait and Station: ambulates without assistance    PSYCHIATRIC   Level of Consciousness: alert  Orientation: to person, place, time  Grooming: intact, neat though has not showered on unit  Psychomotor Behavior: no retardation or agitation  Speech: decreased spontaneity, answers questions briefly, soft volume  Language: fluent english, repeats words/phrases  Mood: "frightened"  Affect: childlike, anxious, mood congruent  Thought " Process: linear but guarded  Associations: intact, no loosening of associations  Thought Content: denies SI  Memory: grossly intact  Attention: not distractible  Fund of Knowledge: intact  Insight: impaired  Judgment: improved      Family History     Problem Relation (Age of Onset)    Diabetes Mother, Paternal Grandfather    Hypertension Father    Migraines Father    Seizures Maternal Grandmother        Social History Main Topics    Smoking status: Never Smoker    Smokeless tobacco: Not on file    Alcohol use No    Drug use: No    Sexual activity: No     Psychotherapeutics     Start     Stop Route Frequency Ordered    02/17/18 1200  fluvoxaMINE tablet 50 mg      -- Oral 2 times daily 02/17/18 1048    02/17/18 1152  LORazepam tablet 0.5 mg      -- Oral Every 12 hours PRN 02/17/18 1053           Review of Systems  Objective:     Vital Signs (Most Recent):  Temp: 98.9 °F (37.2 °C) (02/20/18 0800)  Pulse: 94 (02/20/18 0800)  Resp: 17 (02/20/18 0800)  BP: 120/79 (02/20/18 0800) Vital Signs (24h Range):  Temp:  [98.9 °F (37.2 °C)-99.5 °F (37.5 °C)] 98.9 °F (37.2 °C)  Pulse:  [75-94] 94  Resp:  [16-17] 17  BP: (120-133)/(76-79) 120/79     Height: 5' (152.4 cm)  Weight: 66.4 kg (146 lb 6.2 oz)  Body mass index is 28.59 kg/m².    No intake or output data in the 24 hours ending 02/20/18 1314    Physical Exam     Significant Labs:   Last 24 Hours:   Recent Lab Results     None          Significant Imaging: None    Assessment/Plan:     * OCD (obsessive compulsive disorder)    Patient with OCD, with significant functional impact, admitted for stabilization and improvement of function.    She has had scant pharmacologic treatment to date and it is expected that her symptoms will improve with more aggressive pharmacologic management.      She is on Luvox for past month but at low dose - this is first trial of SSRI other than a day or two of zoloft in past.  We have titrated Luvox on unit to 50mg bid, but I would expect that  further titration will be needed outside hospital.  However we need to give the dose adjustment 2-4 weeks and then reassess.  Luvox can be titrated up to 300mg total daily dose if need be.    Given she has had inadequate trial of serotonin based medication, would not yet add on atypical neuroleptic.  Will therefore discontinue risperdal.  We can restart risperdal in future if monotherapy with SSRI inadequate for symptom control.            Anxiety    Patient also endorses agoraphobia and social anxiety.     Cont trial of Luvox, also klonopin as prn             Need for Continued Hospitalization:   plan is for discharge tomorrow once dispo plan in place    Anticipated Disposition: Home or Self Care           FAMILY MEETING WITH PATIENT PRESENT [id number]    Site: [facility] [address]  Time: 40 minutes  Participants: Met with patient and mother    Therapeutic Intervention Type: supportive psychotherapy  Why chosen therapy is appropriate versus another modality: relevant to diagnosis    Target symptoms: anxiety   Primary focus: clinical risk assessment performed with patient and mother - all parties agree she is safe and not a danger to self or others.  She was admitted with grave disability but PO intake and ADLs improved on unit, which allows for transition back home.  We spoke about pharmacologic and psychotherapeutic approach to OCD management.  Psychotherapeutic techniques: supportive listening, exploratory questions, clarification    Outcome monitoring methods: self-report, observation, feedback from family    Patient's response to intervention:  The patient's response to intervention is accepting, guarded.    Progress toward goals:  The patient's progress toward goals is progressing slowly.          oPrter Enriquez MD   Psychiatry  [facility]

## 2018-02-20 NOTE — PLAN OF CARE
02/20/18 1430   [facility] Group Therapy   Group Name Medication   Participation Quality Refused

## 2018-02-20 NOTE — MEDICAL/APP STUDENT
"Subjective  During 1-1 interview, pt once again appears more comfortable, had much improved eye contact and spoke in a higher volume. Pt visibly excited to be discharged and return home tomorrow to see her nephew. When asked about taking medications after discharge pt demonstrates insight that medications are necessary, stating that she has wanting to "find peace for a while now" and so she plans to keep taking them. Pt names the "weird way it makes my eyes feel" as chief reason for noncompliance; pt notes that it impacts her ability to draw as well as she normally would which she finds frustrating. Pt also agreed to let nurse show her shower works and to then take a shower. Pt also noted to eat lunch with group and not alone in the hallway as she did yesterday.     PMHx  Past Medical History Reviewed    ROS  Not assessed today      Examination:  CONSTITUTIONAL  General Appearance: dressed neatly but denies showering as yet on unit     MUSCULOSKELETAL  Muscle Strength and Tone: normal  Abnormal Involuntary Movements: none  Gait and Station: normal    PSYCHIATRIC   Level of Consciousness: alert and awake  Orientation: oriented to person, place, time, and situation   Grooming: neat  Psychomotor Behavior: some fidgeting with hands, seems to be more from excitement  Speech: soft tone, normal rate, rhythm, vocabulary appropriate for age  Language: no dysarthria or aphasia  Mood: "very excited to go home!"  Affect: eye contact much improved from during rounds, reactive   Thought Process:  linear   Associations: none  Thought Content: denies SI/HI/AVH  Memory:  3/3 immediate recall; 3/3 recall 5 min later   Attention: 5/5 "world" ; 5/5' "dlrow"  Fund of Knowledge: intact  Insight: intact  Judgment: limited     Family History   Problem Relation Age of Onset    Diabetes Mother     Hypertension Father     Migraines Father     Seizures Maternal Grandmother     Diabetes Paternal Grandfather      Social History     Social " History    Marital status: Single     Spouse name: N/A    Number of children: N/A    Years of education: N/A     Occupational History    Not on file.     Social History Main Topics    Smoking status: Never Smoker    Smokeless tobacco: Not on file    Alcohol use No    Drug use: No    Sexual activity: No     Other Topics Concern    Not on file     Social History Narrative    No narrative on file     Psychotherapeutics     Start     Stop Route Frequency Ordered    02/17/18 1200  fluvoxaMINE tablet 50 mg      -- Oral 2 times daily 02/17/18 1048    02/17/18 1152  LORazepam tablet 0.5 mg      -- Oral Every 12 hours PRN 02/17/18 1053          Objective  Vitals:   Vitals:    02/20/18 0800   BP: 120/79   Pulse: 94   Resp: 17   Temp: 98.9 °F (37.2 °C)     Vital Sign Min/Max (last 24 hours)   Value Min Max   Temp 98.9 °F (37.2 °C) 99.5 °F (37.5 °C)   Pulse 75 94   Resp 16 17   BP: Systolic 120 133   BP: Diastolic 76 79     Height: 5' (152.4 cm)  Weight: 66.4 kg (146 lb 6.2 oz)  Body mass index is 28.59 kg/m².    Significant Labs:   Last 24 Hours:         Recent Lab Results      None         Significant Imaging: None      Assessment/Plan:   OCD (obsessive compulsive disorder)  Patient with OCD, with significant functional impact, admitted for stabilization and improvement of function.     She has had scant pharmacologic treatment to date and it is expected that her symptoms will improve with more aggressive pharmacologic management.       She is on Luvox for past month but at low dose - this is first trial of SSRI other than a day or two of zoloft in past.  We have titrated Luvox on unit to 50mg bid, but I would expect that further titration will be needed outside hospital.  However we need to give the dose adjustment 2-4 weeks and then reassess.  Luvox can be titrated up to 300mg total daily dose if need be.     Given she has had inadequate trial of serotonin based medication, would not yet add on atypical  neuroleptic.  Will therefore discontinue risperdal.  We can restart risperdal in future if monotherapy with SSRI inadequate for symptom control.      Anxiety     Patient also endorses agoraphobia and social anxiety.      Cont trial of Luvox, also klonopin as prn                Need for Continued Hospitalization:   plan is for discharge tomorrow once dispo plan in place     Anticipated Disposition: Home or Self Care

## 2018-02-20 NOTE — SUBJECTIVE & OBJECTIVE
"Interval History: patient remains isolated on unit.  She remains frightened, childlike, anxious.  She continues to report attenuation of OCD behaviors on unit.  She denies SI.  She is noted to have improved PO intake and ADLs on unit.  She is compliant with med regimen on unit - tolerating well.  Family meeting today with mother.      PMHx  Past Medical History Reviewed    ROS  GI: denies N/V/D  Musculoskeletal: denies pain      EXAMINATION    VITALS   Vitals:    02/20/18 0800   BP: 120/79   Pulse: 94   Resp: 17   Temp: 98.9 °F (37.2 °C)       CONSTITUTIONAL  General Appearance: stated age, casually dressed    MUSCULOSKELETAL  Muscle Strength and Tone: no weakness or spasticity  Abnormal Involuntary Movements: no tremor or akathisia, no evidence of tardive dyskinesia  Gait and Station: ambulates without assistance    PSYCHIATRIC   Level of Consciousness: alert  Orientation: to person, place, time  Grooming: intact, neat though has not showered on unit  Psychomotor Behavior: no retardation or agitation  Speech: decreased spontaneity, answers questions briefly, soft volume  Language: fluent english, repeats words/phrases  Mood: "frightened"  Affect: childlike, anxious, mood congruent  Thought Process: linear but guarded  Associations: intact, no loosening of associations  Thought Content: denies SI  Memory: grossly intact  Attention: not distractible  Fund of Knowledge: intact  Insight: impaired  Judgment: improved      Family History     Problem Relation (Age of Onset)    Diabetes Mother, Paternal Grandfather    Hypertension Father    Migraines Father    Seizures Maternal Grandmother        Social History Main Topics    Smoking status: Never Smoker    Smokeless tobacco: Not on file    Alcohol use No    Drug use: No    Sexual activity: No     Psychotherapeutics     Start     Stop Route Frequency Ordered    02/17/18 1200  fluvoxaMINE tablet 50 mg      -- Oral 2 times daily 02/17/18 1048    02/17/18 1152  LORazepam " tablet 0.5 mg      -- Oral Every 12 hours PRN 02/17/18 1053           Review of Systems  Objective:     Vital Signs (Most Recent):  Temp: 98.9 °F (37.2 °C) (02/20/18 0800)  Pulse: 94 (02/20/18 0800)  Resp: 17 (02/20/18 0800)  BP: 120/79 (02/20/18 0800) Vital Signs (24h Range):  Temp:  [98.9 °F (37.2 °C)-99.5 °F (37.5 °C)] 98.9 °F (37.2 °C)  Pulse:  [75-94] 94  Resp:  [16-17] 17  BP: (120-133)/(76-79) 120/79     Height: 5' (152.4 cm)  Weight: 66.4 kg (146 lb 6.2 oz)  Body mass index is 28.59 kg/m².    No intake or output data in the 24 hours ending 02/20/18 1314    Physical Exam     Significant Labs:   Last 24 Hours:   Recent Lab Results     None          Significant Imaging: None

## 2018-02-20 NOTE — PROGRESS NOTES
02/19/18 2000   [facility] Group Therapy   Group Name Community Reintegration   Specific Interventions Other (see comments)  (wrap up)   Participation Level None   Participation Quality Refused

## 2018-02-20 NOTE — PROGRESS NOTES
02/20/18 0900 02/20/18 1100 02/20/18 1300   [facility] Group Therapy   Group Name Community Reintegration Education Therapeutic Recreation   Specific Interventions Current Events Guided Imagery/Relaxation Skilled Activity Mild Exercises   Participation Quality Refused;Withdrawn Refused;Withdrawn Refused;Withdrawn   Affect/Mood Display --  --  Blunted;Anxious   Patient presents noticeably anxious when pressed to attend activities; Refused all groups after maximum encouragement

## 2018-02-20 NOTE — PLAN OF CARE
"Problem: Patient Care Overview (Adult)  Goal: Plan of Care Review  Outcome: Ongoing (interventions implemented as appropriate)  Cooperative. Remains anxious, depressed. Isolated to room. Did not participate in evening group. Presents with guarded affect. Minimal interaction with peers and staff. When asked about mood, patient verbalized feeling "the same." Continues with poor appetite. Medication compliant. Modified visual contact maintained per MD orders.         "

## 2018-02-20 NOTE — ASSESSMENT & PLAN NOTE
Patient also endorses agoraphobia and social anxiety.     Cont trial of Luvox, also klonopin as prn

## 2018-02-21 VITALS
WEIGHT: 146.38 LBS | RESPIRATION RATE: 17 BRPM | HEART RATE: 85 BPM | SYSTOLIC BLOOD PRESSURE: 143 MMHG | HEIGHT: 60 IN | DIASTOLIC BLOOD PRESSURE: 70 MMHG | BODY MASS INDEX: 28.74 KG/M2 | TEMPERATURE: 98 F

## 2018-02-21 PROCEDURE — 25000003 PHARM REV CODE 250: Performed by: PSYCHIATRY & NEUROLOGY

## 2018-02-21 PROCEDURE — 99239 HOSP IP/OBS DSCHRG MGMT >30: CPT | Mod: AF,HA,, | Performed by: PSYCHIATRY & NEUROLOGY

## 2018-02-21 RX ORDER — FLUVOXAMINE MALEATE 50 MG/1
50 TABLET ORAL 2 TIMES DAILY
Qty: 60 TABLET | Refills: 0 | Status: SHIPPED | OUTPATIENT
Start: 2018-02-21 | End: 2019-02-21

## 2018-02-21 RX ADMIN — FLUVOXAMINE MALEATE 50 MG: 50 TABLET, FILM COATED ORAL at 08:02

## 2018-02-21 RX ADMIN — CLONAZEPAM 0.5 MG: 0.5 TABLET ORAL at 08:02

## 2018-02-21 NOTE — PROGRESS NOTES
Pt slept 8 hours she remains isolated in her room no interaction with her peers. MVC in place will continue to monitor.

## 2018-02-21 NOTE — NURSING
Pt discharged home to care of self/family. Pt ambulatory with steady gait. Denies depressed mood or thoughts of self harm. Pt is future oriented and goal directed. discharged medications reviewed and paper prescription provided. Pt instructed to follow up with [facility] for outpatient services. All personal belongings verified with pt and returned. Pt acknowledges and verbalizes understanding of all discharge instructions and follow up recommendations. Pt transported home with family and all belongings.

## 2018-02-21 NOTE — NURSING
Discharge summary faxed to Argenis at [facility] ([phone]). Her  will call her directly to set up appointment time.

## 2018-02-21 NOTE — PLAN OF CARE
"Problem: Patient Care Overview (Adult)  Goal: Plan of Care Review  Outcome: Ongoing (interventions implemented as appropriate)  POC discussed pt does not attend any groups, she denies SI/HI/AVH's. She has a flat and depressive affect. Thoughts are linear but she does not engage in conversation freely. Pt has a fair appetite and is med compliant. She has social anxiety and isolates in her room. Pt does not attend any groups. Her appearance is well kept. MVC in place will continue to monitor.     Problem: Mood Impairment (Anxiety Signs/Symptoms) (Adult)  Goal: Improved Mood Symptoms  Outcome: Ongoing (interventions implemented as appropriate)  Pt is cooperative but remains isolative flat and depressed.     Problem: Mood Impairment (Depressive Signs/Symptoms) (Adult)  Goal: Improved Mood Symptoms  Outcome: Ongoing (interventions implemented as appropriate)  Pt states her mood as " alright". She would not expand on the comment.     Problem: Weight/Appetite Change (Depressive Signs/Symptoms) (Adult)  Goal: Nutrition/Weight Optimized  Outcome: Ongoing (interventions implemented as appropriate)  Pt has an adequate intake no problems eating.     Problem: Behavior Regulation (Impulse Control) Impairment (Obsessive-Compulsive Symptoms) (Adult,Pediatric)  Goal: Improved Behavior Regulation and Impulse Control (Obsessive-Compulsive Symptoms)  Outcome: Ongoing (interventions implemented as appropriate)  Pt has been calm and cooperative on the unit, she is socially shy and does not interact with others on the unit.     Problem: Emotion/Mood Impairment (Obsessive-Compulsive Symptoms) (Adult)  Goal: Improved Mood Symptoms (Obsessive-Compulsive Symptoms)  Outcome: Ongoing (interventions implemented as appropriate)  Pt has a flat and depressive affect. She isolates in her room only coming out for vital signs and medicines.       "

## 2018-02-21 NOTE — DISCHARGE SUMMARY
"[facility]  Psychiatry  Discharge Summary      Patient Name: Dana Storm  MRN: [id number]  Admission Date: 2/17/2018  Hospital Length of Stay: 4 days  Discharge Date and Time:  02/21/2018 10:59 AM  Attending Physician: Porter Enriquez MD   Discharging Provider: Thony Gomez DO  Primary Care Provider: Jose Aguilar MD    HPI:   Patient is a 23 y/o woman with past psychiatric h/o OCD who presented as a transfer from [facility] on 2/17/18. Per report, mother brought patient to the hospital as patient has been anxious to the point of not being able to leave the bed even to use the bathroom. Patient affirms this and says "I can't do anything..it's getting worse, my medicine isn't working." Says her issues are primary with germs, takes 1-2 showers/day and washes her hands a lot. H/o excessive checking behaviors when she was younger that she says have improved, denies repetitive thoughts or behaviors. Endorses increased depression, increased anxiety (including agoraphobia and social phobia), decreased appetite (1-2 meals/day) and poor sleep. Denies SI, h/o SA, key, panic attacks or AVH. Reluctant to discuss stressors, asks "can my mom tell you?" Alludes to some conflict in the home. Sees a psychiatrist/therapist monthly and journals regularly, also has a worker who comes to the home twice weekly. Denies prior psychiatric hospitalizations. Patient unsure of home medications.     Collateral: obtained by CM Julie Haase from patient's mother, Azucena ([phone] [phone]).   Mother reports that patient takes Clonazepam 0.5mg twice a day and Flovoxamine Maleate 50 mg daily. Mother has been making patient take medication so she has been compliant for at least 30 days.Mother states that patient was a preemie (6 weeks early) and has always been delayed. Reports no medical history other than severe migraines an a child. She has a long history of anxiety and fear of new " "situations. Has always been shy and suspicious of people and does not have friends. Since October of 2016, mother reports patient has been having "meltdowns."  She states that patient will sometimes sit in her room, not eat, and not talk other than to tell her mother that "things in her head keep repeating."  Mother states that patient has gotten so frustrated with this that she has screamed and punched herself in the leg. Mother emphasizes that this is not a self harming behavior. She has never been violent towards anyone.  She will cry.  She reported that patient will  her closet for hours.  The patient believes bad things will happen to people if she doesn't perform a certain ritual (explaine: touch door frame 3 times.) Sometimes mother has to bathe her because patient gets worried about germs and will ask mother over and over again if an objected  touched something  before it touched her (pt)  because she is worried "something bad will happen."  When she gets so focused on these types of questions, she can't perform her ADLs. She has these episodes almost daily and sometimes twice daily. She "really can't remember the last time she was doing well." Mother thinks patient's stressors are finishing high school and not knowing what to do. She does not do well with change. Mother also reports lots of stress in the home. Mother and father live in the home, along with patient's 82 year old grandfather who has dementia, three younger nieces, and brother and nephew live there off and on. She states her and the patient's father have been fighting and that he is a "jerk." Father has some understanding of patient's illness but will get angry with her when she has an episode. Patient is followed by a , Lindsay, at [facility] in [place] ([phone]). She was seeing a doctor there but mother states that patient wouldn't talk to him. She saw a nurse practitoner once. She is followed by ACT " team (Keila) who comes to the house twice a week and a Ms. Snider who comes monthly for psychotherapy to work with patient on her anxiety. They have been following Dana for about 6-8 months. Mother suggested that we call Lindsay for more info about her psychiatric care. She has no previous inpatient admissions. Per mother, pt is very attached to her and dependant on her. She said she would be available for a family meeting. She is supportive of patient. She asked about visiting hours and if she could bring the patient her stuffed animal and pillow. She also wanted to know if she should come visit at all because she was afraid that patient would have a bad meltdown when she left. Discussed above with treatment team and charge nurse. Per unit policy, patient will not be able to have her stuffed animal or pillow. Treatment teams feels patient may benefit more from a visit tomorrow after she's had a day to adjust to the unit and suggested that mom wait until Sunday to visit.     Past Psychiatric History:  Previous Medication Trials: pt unsure  Previous Psychiatric Hospitalizations: no   Previous Suicide Attempts: no   History of Violence: no  Outpatient Psychiatrist: ACT team Dr Snider, counselor Lindsay    Social History:  Marital Status: single  Children: 0   Employment Status/Info: unemployed, helps out at home  Education: high school diploma/GED  Special Ed: yes  : did not assess  Religious: did not assess  Housing Status: lives with [facility] with dementia, mom, dad, 3 cousins (ages 6, 8, 9), brother and occasionally brother's young son  Hobbies/Leisure time: baking, playing games  History of phys/sexual abuse: did not assess; denies h/o trauma  Access to gun: no    Family Psychiatric History:   Family history of anxiety in [facility].     Substance Abuse History:  Recreational Drugs: no  Use of Alcohol: denied  Rehab History:no   Tobacco Use:no    Legal History:  Past Charges/Incarcerations:no   Pending charges:no  "    Neuro History:  H/o migraines, does not currently take meds. No h/o concussions, seizures.    Hospital Course:   2/18/2018: Isolating on the unit.  Continues to be timid, very anxious.  Not eating, but agreeable to drinking gatorade.   2/19/18 - isolative on unit, constricted, does not appear to be engaging in OCD behavior to great extent.  She is eating and attending to ADLs in hospital.    2/20/18 - remains isolative on unit, frightened and anxious.  PO intake and ADLs both improved.  Compliant with medication regimen.  2/21/18 day of discharge- Patient seen in treatment team this morning. She reports that she is happy that she is going to go home today. She reports that she has not had as many symptoms of OCD on the unit because she is distracted by being in a new place. She has been eating and sleeping on the unit and she took a shower yesterday. Patient was given education of her Luvox and the importance of remaining compliant on the medication after discharge.      CONSTITUTIONAL  General Appearance: stated age, casually dressed     MUSCULOSKELETAL  Muscle Strength and Tone: no weakness or spasticity  Abnormal Involuntary Movements: no tremor or akathisia, no evidence of tardive dyskinesia  Gait and Station: ambulates without assistance     PSYCHIATRIC   Level of Consciousness: alert  Orientation: to person, place, time  Grooming: intact, neat though has not showered on unit  Psychomotor Behavior: no retardation or agitation  Speech: decreased spontaneity, answers questions briefly, soft volume  Language: fluent english, repeats words/phrases  Mood: "frightened"  Affect: childlike, anxious, mood congruent  Thought Process: linear but guarded  Associations: intact, no loosening of associations  Thought Content: denies SI  Memory: grossly intact  Attention: not distractible  Fund of Knowledge: intact  Insight: improved  Judgment: improved  * No surgery found *     Consults:   Physical Exam     Significant " Labs:   Last 24 Hours:   Recent Lab Results     None          Significant Imaging: I have reviewed all pertinent imaging results/findings within the past 24 hours.    Smoking Cessation:   Does the patient smoke? No  Does the patient want a prescription for Smoking Cessation? No  Does the patient want counseling for Smoking Cessation? No         Pending Diagnostic Studies:     None        Final Active Diagnoses:    Diagnosis Date Noted POA    PRINCIPAL PROBLEM:  OCD (obsessive compulsive disorder) [F42.9] 12/12/2016 Yes    Anxiety [F41.9] 12/12/2016 Yes      Problems Resolved During this Admission:    Diagnosis Date Noted Date Resolved POA      No new Assessment & Plan notes have been filed under this hospital service since the last note was generated.  Service: Psychiatry      Functional Condition: Independent ambulation    Discharged Condition: good    Disposition: Home or Self Care   Recommend titrating Luvox up 50 mg monthly until symptoms resolve or patient gets to 300 mg daily. If patient is unable to tolerate medication would recommend trying another SSRI in high doses (Prozac or Zoloft). If patient's symptoms remain would consider adding a neuroleptic such as Risperdal to the SSRI.     Follow Up:  Follow-up Information     [email or web address] On 2/20/2018.    Why:  mental health follow up  Contact information:  [facility]  [place], [place]   ([phone]).                Patient Instructions:     Diet Adult Regular     Call MD for:   Order Comments: Worsening mood, thoughts of harming self or others, auditory/visual hallucinations     No dressing needed     Activity as tolerated       Medications:  Reconciled Home Medications:   Current Discharge Medication List      CONTINUE these medications which have CHANGED    Details   fluvoxaMINE (LUVOX) 50 MG Tab tablet Take 1 tablet (50 mg total) by mouth 2 (two) times daily.  Qty: 60 tablet, Refills: 0         CONTINUE these medications  which have NOT CHANGED    Details   clonazePAM (KLONOPIN) 0.5 MG tablet Take 0.5 mg by mouth 2 (two) times daily.           Is patient being discharged on multiple antipsychotics? No        Total time:34 minutes with greater than 50% of this time spent in counseling and/or coordination of care.     All elements of the transition record were discussed with the patient at discharge and patient agrees to this plan.    Thony Gomez,   Psychiatry  [facility]

## 2018-02-21 NOTE — PLAN OF CARE
Pt visible on unit. Appears timid with poor eye contact noted. She declined groups and activities with maximum encouragement and reassurance. She isolated in room but was noted to present her artwork to staff. Pt appetite is improving but she refuses to dine with peers. She is compliant with scheduled medications and she did shower this shift. Plan of care reviewed with pt. Remained free from injury and falls this shift. MVC and safety maintained.

## 2018-03-16 ENCOUNTER — OFFICE VISIT (OUTPATIENT)
Dept: PEDIATRICS | Facility: CLINIC | Age: 23
End: 2018-03-16
Payer: MEDICAID

## 2018-03-16 VITALS
TEMPERATURE: 99 F | OXYGEN SATURATION: 100 % | SYSTOLIC BLOOD PRESSURE: 130 MMHG | DIASTOLIC BLOOD PRESSURE: 72 MMHG | BODY MASS INDEX: 26.45 KG/M2 | WEIGHT: 143.75 LBS | HEIGHT: 62 IN | HEART RATE: 97 BPM

## 2018-03-16 DIAGNOSIS — J32.9 RHINOSINUSITIS: Primary | ICD-10-CM

## 2018-03-16 DIAGNOSIS — R05.9 COUGH: ICD-10-CM

## 2018-03-16 PROCEDURE — 99214 OFFICE O/P EST MOD 30 MIN: CPT | Mod: S$GLB,,, | Performed by: PEDIATRICS

## 2018-03-16 RX ORDER — AZITHROMYCIN 250 MG/1
TABLET, FILM COATED ORAL
Qty: 6 TABLET | Refills: 0 | Status: SHIPPED | OUTPATIENT
Start: 2018-03-16

## 2018-03-16 RX ORDER — BENZONATATE 200 MG/1
200 CAPSULE ORAL 3 TIMES DAILY PRN
Qty: 30 CAPSULE | Refills: 1 | Status: SHIPPED | OUTPATIENT
Start: 2018-03-16 | End: 2018-03-26

## 2018-03-16 NOTE — PROGRESS NOTES
"  Subjective:     History was provided by the patient and mother.  Dana Storm is a 22 y.o. female here for evaluation of sore throat, congestion, ear pressure, sinus pressure, non productive cough and productive cough. Symptoms began 6 days ago. Sounding hoarse when she talks. Felt hot yesterday.  Associated symptoms include:congestion, cough, headache and sinus pressure. Patient denies: vomiting, diarrhea, or fevers. Patient has a history of as below. Current treatments have included acetaminophen, with little improvement.   Patient has had good liquid intake, with adequate urine output.    Sick contacts? No  Other recent illnesses? Yes    Past Medical History:  I have reviewed patient's past medical history and it is pertinent for:  Patient Active Problem List    Diagnosis Date Noted    Anxiety 12/12/2016    OCD (obsessive compulsive disorder) 12/12/2016     Review of Systems   Constitutional: Negative for chills and fever.   HENT: Positive for congestion and sinus pain. Negative for sore throat.    Respiratory: Positive for cough and sputum production. Negative for wheezing.    Gastrointestinal: Negative for constipation, diarrhea, nausea and vomiting.   Genitourinary: Negative for dysuria.   Skin: Negative for rash.        Objective:    /72 (BP Location: Left arm, Patient Position: Sitting, BP Method: Medium (Automatic))   Pulse 97   Temp 98.9 °F (37.2 °C) (Skin)   Ht 5' 2" (1.575 m)   Wt 65.2 kg (143 lb 11.8 oz)   LMP 02/21/2018   SpO2 100%   BMI 26.29 kg/m²   Physical Exam   Constitutional: She appears well-developed and well-nourished. No distress.   HENT:   Head: Normocephalic.   Right Ear: External ear normal.   Left Ear: External ear normal.   Nose: Nose normal.   Mouth/Throat: Oropharynx is clear and moist. No oropharyngeal exudate.   Thick copious PND and nasal discharge, TMs clear   Eyes: Conjunctivae are normal.   Neck: Neck supple.   Cardiovascular: Normal rate, regular rhythm " and normal heart sounds.  Exam reveals no gallop and no friction rub.    No murmur heard.  Pulmonary/Chest: Effort normal and breath sounds normal. No respiratory distress. She has no wheezes. She has no rales.   Neurological: She is alert.   Skin: Skin is warm. Capillary refill takes less than 2 seconds.   Nursing note and vitals reviewed.    Assessment:     Rhinosinusitis  -     azithromycin (Z-YOHAN) 250 MG tablet; 2 tablets on day 1, then 1 tablet daily until finished  Dispense: 6 tablet; Refill: 0    Cough  -     benzonatate (TESSALON) 200 MG capsule; Take 1 capsule (200 mg total) by mouth 3 (three) times daily as needed.  Dispense: 30 capsule; Refill: 1      Plan:   1.  Supportive care including nasal saline and/or suctioning, encouraging PO fluid intake with pedialyte, and use of anti-pyretics discussed with family.  Also discussed reasons to return to clinic or ER including high fevers, decreased alertness, signs of respiratory distress, or inability to tolerate PO fluids.

## 2022-04-20 ENCOUNTER — HOSPITAL ENCOUNTER (EMERGENCY)
Facility: HOSPITAL | Age: 27
Discharge: HOME | End: 2022-04-20
Attending: INTERNAL MEDICINE
Payer: MEDICAID

## 2022-04-20 VITALS
OXYGEN SATURATION: 98 % | BODY MASS INDEX: 25.68 KG/M2 | TEMPERATURE: 98 F | HEIGHT: 61 IN | WEIGHT: 136 LBS | RESPIRATION RATE: 20 BRPM | SYSTOLIC BLOOD PRESSURE: 122 MMHG | HEART RATE: 73 BPM | DIASTOLIC BLOOD PRESSURE: 60 MMHG

## 2022-04-20 DIAGNOSIS — R10.30 LOWER ABDOMINAL PAIN: Primary | ICD-10-CM

## 2022-04-20 DIAGNOSIS — R11.2 NON-INTRACTABLE VOMITING WITH NAUSEA, UNSPECIFIED VOMITING TYPE: ICD-10-CM

## 2022-04-20 LAB
ALBUMIN SERPL BCP-MCNC: 4.8 G/DL (ref 3.5–5.2)
ALP SERPL-CCNC: 67 U/L (ref 55–135)
ALT SERPL W/O P-5'-P-CCNC: 7 U/L (ref 10–44)
ANION GAP SERPL CALC-SCNC: 13 MMOL/L (ref 8–16)
AST SERPL-CCNC: 10 U/L (ref 10–40)
B-HCG UR QL: NEGATIVE
BACTERIA #/AREA URNS HPF: ABNORMAL /HPF
BACTERIA GENITAL QL WET PREP: ABNORMAL
BASOPHILS # BLD AUTO: 0.06 K/UL (ref 0–0.2)
BASOPHILS NFR BLD: 0.8 % (ref 0–1.9)
BILIRUB SERPL-MCNC: 0.9 MG/DL (ref 0.1–1)
BILIRUB UR QL STRIP: NEGATIVE
BUN SERPL-MCNC: 10 MG/DL (ref 6–20)
CALCIUM SERPL-MCNC: 9.6 MG/DL (ref 8.7–10.5)
CHLORIDE SERPL-SCNC: 105 MMOL/L (ref 95–110)
CLARITY UR: CLEAR
CLUE CELLS VAG QL WET PREP: ABNORMAL
CO2 SERPL-SCNC: 23 MMOL/L (ref 23–29)
COLOR UR: YELLOW
CREAT SERPL-MCNC: 0.9 MG/DL (ref 0.5–1.4)
CTP QC/QA: YES
DIFFERENTIAL METHOD: NORMAL
EOSINOPHIL # BLD AUTO: 0 K/UL (ref 0–0.5)
EOSINOPHIL NFR BLD: 0.4 % (ref 0–8)
ERYTHROCYTE [DISTWIDTH] IN BLOOD BY AUTOMATED COUNT: 13.2 % (ref 11.5–14.5)
EST. GFR  (AFRICAN AMERICAN): >60 ML/MIN/1.73 M^2
EST. GFR  (NON AFRICAN AMERICAN): >60 ML/MIN/1.73 M^2
FILAMENT FUNGI VAG WET PREP-#/AREA: ABNORMAL
GLUCOSE SERPL-MCNC: 79 MG/DL (ref 70–110)
GLUCOSE UR QL STRIP: NEGATIVE
HCT VFR BLD AUTO: 40.5 % (ref 37–48.5)
HGB BLD-MCNC: 13.1 G/DL (ref 12–16)
HGB UR QL STRIP: NEGATIVE
HYALINE CASTS #/AREA URNS LPF: 0 /LPF
IMM GRANULOCYTES # BLD AUTO: 0.01 K/UL (ref 0–0.04)
IMM GRANULOCYTES NFR BLD AUTO: 0.1 % (ref 0–0.5)
KETONES UR QL STRIP: ABNORMAL
LEUKOCYTE ESTERASE UR QL STRIP: NEGATIVE
LIPASE SERPL-CCNC: 9 U/L (ref 4–60)
LYMPHOCYTES # BLD AUTO: 2.2 K/UL (ref 1–4.8)
LYMPHOCYTES NFR BLD: 29 % (ref 18–48)
MCH RBC QN AUTO: 28.5 PG (ref 27–31)
MCHC RBC AUTO-ENTMCNC: 32.3 G/DL (ref 32–36)
MCV RBC AUTO: 88 FL (ref 82–98)
MICROSCOPIC COMMENT: ABNORMAL
MONOCYTES # BLD AUTO: 0.6 K/UL (ref 0.3–1)
MONOCYTES NFR BLD: 7.2 % (ref 4–15)
NEUTROPHILS # BLD AUTO: 4.8 K/UL (ref 1.8–7.7)
NEUTROPHILS NFR BLD: 62.5 % (ref 38–73)
NITRITE UR QL STRIP: NEGATIVE
NRBC BLD-RTO: 0 /100 WBC
PH UR STRIP: 6 [PH] (ref 5–8)
PLATELET # BLD AUTO: 267 K/UL (ref 150–450)
PMV BLD AUTO: 11.1 FL (ref 9.2–12.9)
POTASSIUM SERPL-SCNC: 3.5 MMOL/L (ref 3.5–5.1)
PROT SERPL-MCNC: 8.1 G/DL (ref 6–8.4)
PROT UR QL STRIP: ABNORMAL
RBC # BLD AUTO: 4.59 M/UL (ref 4–5.4)
RBC #/AREA URNS HPF: 0 /HPF (ref 0–4)
SODIUM SERPL-SCNC: 141 MMOL/L (ref 136–145)
SP GR UR STRIP: >1.03 (ref 1–1.03)
SPECIMEN SOURCE: ABNORMAL
T VAGINALIS GENITAL QL WET PREP: ABNORMAL
URN SPEC COLLECT METH UR: ABNORMAL
UROBILINOGEN UR STRIP-ACNC: NEGATIVE EU/DL
WBC # BLD AUTO: 7.69 K/UL (ref 3.9–12.7)
WBC #/AREA URNS HPF: 5 /HPF (ref 0–5)
WBC #/AREA VAG WET PREP: ABNORMAL
YEAST GENITAL QL WET PREP: ABNORMAL

## 2022-04-20 PROCEDURE — 85025 COMPLETE CBC W/AUTO DIFF WBC: CPT | Performed by: PHYSICIAN ASSISTANT

## 2022-04-20 PROCEDURE — 87591 N.GONORRHOEAE DNA AMP PROB: CPT | Performed by: PHYSICIAN ASSISTANT

## 2022-04-20 PROCEDURE — 80053 COMPREHEN METABOLIC PANEL: CPT | Performed by: PHYSICIAN ASSISTANT

## 2022-04-20 PROCEDURE — 87210 SMEAR WET MOUNT SALINE/INK: CPT | Performed by: PHYSICIAN ASSISTANT

## 2022-04-20 PROCEDURE — 87491 CHLMYD TRACH DNA AMP PROBE: CPT | Performed by: PHYSICIAN ASSISTANT

## 2022-04-20 PROCEDURE — 99285 EMERGENCY DEPT VISIT HI MDM: CPT | Mod: 25

## 2022-04-20 PROCEDURE — 96360 HYDRATION IV INFUSION INIT: CPT | Mod: 59

## 2022-04-20 PROCEDURE — 83690 ASSAY OF LIPASE: CPT | Performed by: PHYSICIAN ASSISTANT

## 2022-04-20 PROCEDURE — 81000 URINALYSIS NONAUTO W/SCOPE: CPT | Performed by: PHYSICIAN ASSISTANT

## 2022-04-20 PROCEDURE — 25500020 PHARM REV CODE 255: Performed by: INTERNAL MEDICINE

## 2022-04-20 PROCEDURE — 81025 URINE PREGNANCY TEST: CPT | Performed by: EMERGENCY MEDICINE

## 2022-04-20 PROCEDURE — 25000003 PHARM REV CODE 250: Performed by: PHYSICIAN ASSISTANT

## 2022-04-20 RX ORDER — MELOXICAM 7.5 MG/1
7.5 TABLET ORAL DAILY
Qty: 12 TABLET | Refills: 0 | Status: SHIPPED | OUTPATIENT
Start: 2022-04-20

## 2022-04-20 RX ORDER — ONDANSETRON 4 MG/1
8 TABLET, FILM COATED ORAL EVERY 6 HOURS PRN
Qty: 30 TABLET | Refills: 0 | Status: SHIPPED | OUTPATIENT
Start: 2022-04-20 | End: 2022-05-20

## 2022-04-20 RX ORDER — DICYCLOMINE HYDROCHLORIDE 20 MG/1
20 TABLET ORAL 4 TIMES DAILY
Qty: 12 TABLET | Refills: 0 | Status: SHIPPED | OUTPATIENT
Start: 2022-04-20 | End: 2022-04-23

## 2022-04-20 RX ORDER — KETOROLAC TROMETHAMINE 30 MG/ML
15 INJECTION, SOLUTION INTRAMUSCULAR; INTRAVENOUS
Status: DISCONTINUED | OUTPATIENT
Start: 2022-04-20 | End: 2022-04-20 | Stop reason: HOSPADM

## 2022-04-20 RX ORDER — ONDANSETRON 2 MG/ML
8 INJECTION INTRAMUSCULAR; INTRAVENOUS
Status: DISCONTINUED | OUTPATIENT
Start: 2022-04-20 | End: 2022-04-20 | Stop reason: HOSPADM

## 2022-04-20 RX ADMIN — SODIUM CHLORIDE 1000 ML: 0.9 INJECTION, SOLUTION INTRAVENOUS at 06:04

## 2022-04-20 RX ADMIN — IOHEXOL 75 ML: 350 INJECTION, SOLUTION INTRAVENOUS at 07:04

## 2022-04-20 NOTE — ED PROVIDER NOTES
Encounter Date: 4/20/2022    SCRIBE #1 NOTE: I, Gonzalez Bethea, am scribing for, and in the presence of,  Shamir Ervin PA-C. I have scribed the following portions of the note - Other sections scribed: HPI, ROS.       History     Chief Complaint   Patient presents with    Abdominal Pain    Nausea    Vomiting     Patient reports nausea, vomiting, and lower abdominal discomfort with palpation. Patient states that symptoms started in February. Denies diarrhea. Patient also reports loss of appetite.      26 y.o. female, with no pertinent past medical history presents to the ED with nausea and vomiting that began 2 months ago. Pt states that her vomiting episodes have worsened and become more frequent over the last week. Pt states that she is also experiencing lower abdominal pain on palpation, loss of appetite, and vaginal discharge. Pt states that she took an STD test last week that has pending results. Pt went to urgent care prior to arrival today and was told to present to the ED. Pt states that her last menstrual period was on 04/19 and lasted until 04/14. Pt states that she has seen her PCP for this issue in the past and was told to follow up with a specialist. No other exacerbating or alleviating factors. Patient denies fever, or other associated symptoms.       The history is provided by the patient. No  was used.     Review of patient's allergies indicates:  No Known Allergies  History reviewed. No pertinent past medical history.  History reviewed. No pertinent surgical history.  Family History   Problem Relation Age of Onset    Diabetes Mother     Hypertension Father     Migraines Father     Seizures Maternal Grandmother     Diabetes Paternal Grandfather      Social History     Tobacco Use    Smoking status: Never Smoker    Smokeless tobacco: Never Used   Substance Use Topics    Alcohol use: No    Drug use: No     Review of Systems   Constitutional: Positive for appetite  change. Negative for chills and fever.   HENT: Negative for congestion, rhinorrhea and sore throat.    Eyes: Negative for visual disturbance.   Respiratory: Negative for cough and shortness of breath.    Cardiovascular: Negative for chest pain.   Gastrointestinal: Positive for abdominal pain, nausea and vomiting. Negative for diarrhea.   Genitourinary: Positive for vaginal discharge. Negative for dysuria, frequency and hematuria.   Musculoskeletal: Negative for back pain.   Skin: Negative for rash.   Neurological: Negative for dizziness, weakness and headaches.       Physical Exam     Initial Vitals [04/20/22 1638]   BP Pulse Resp Temp SpO2   132/77 96 16 99.1 °F (37.3 °C) 99 %      MAP       --         Physical Exam    Nursing note and vitals reviewed.  Constitutional: She appears well-developed and well-nourished. She is not diaphoretic. No distress.   HENT:   Head: Normocephalic and atraumatic.   Nose: Nose normal.   Eyes: Conjunctivae and EOM are normal. Right eye exhibits no discharge. Left eye exhibits no discharge.   Neck: No tracheal deviation present. No JVD present.   Normal range of motion.  Cardiovascular: Normal rate, regular rhythm and normal heart sounds. Exam reveals no friction rub.    No murmur heard.  Pulmonary/Chest: Breath sounds normal. No stridor. No respiratory distress. She has no wheezes. She has no rhonchi. She has no rales. She exhibits no tenderness.   Abdominal: Abdomen is soft. She exhibits no distension. There is abdominal tenderness (Mild diffuse lower; does not localize).   No right CVA tenderness.  No left CVA tenderness. There is no rebound, no guarding, no tenderness at McBurney's point and negative Barrett's sign. negative Rovsing's sign  Musculoskeletal:         General: No tenderness or edema.      Cervical back: Normal range of motion.     Neurological: She is alert and oriented to person, place, and time.   Skin: Skin is warm and dry. No rash and no abscess noted. No  erythema. No pallor.         ED Course   Procedures  Labs Reviewed   VAGINAL SCREEN - Abnormal; Notable for the following components:       Result Value    WBC - Vaginal Screen Occasional (*)     Bacteria - Vaginal Screen Few (*)     All other components within normal limits    Narrative:     Release to patient->Immediate   URINALYSIS, REFLEX TO URINE CULTURE - Abnormal; Notable for the following components:    Specific Gravity, UA >1.030 (*)     Protein, UA 1+ (*)     Ketones, UA 3+ (*)     All other components within normal limits    Narrative:     Specimen Source->Urine   COMPREHENSIVE METABOLIC PANEL - Abnormal; Notable for the following components:    ALT 7 (*)     All other components within normal limits   URINALYSIS MICROSCOPIC - Abnormal; Notable for the following components:    Bacteria Few (*)     All other components within normal limits    Narrative:     Specimen Source->Urine   POCT URINE PREGNANCY - Normal   C. TRACHOMATIS/N. GONORRHOEAE BY AMP DNA   CBC W/ AUTO DIFFERENTIAL   LIPASE          Imaging Results          CT Abdomen Pelvis With Contrast (Final result)  Result time 04/20/22 19:33:49    Final result by Daniel Hammond MD (04/20/22 19:33:49)                 Impression:      No acute intra-abdominal or pelvic process.    Nonvisualization of the appendix.  No CT findings to suggest acute appendicitis.    Splenomegaly.      Electronically signed by: Daniel Hammond MD  Date:    04/20/2022  Time:    19:33             Narrative:    EXAMINATION:  CT ABDOMEN PELVIS WITH CONTRAST    CLINICAL HISTORY:  Abdominal pain, acute, nonlocalized;    TECHNIQUE:  Low dose axial images, sagittal and coronal reformations were obtained from the lung bases to the pubic symphysis following the IV administration of 75 mL of Omnipaque 350 .  Oral contrast was not given.    COMPARISON:  None.    FINDINGS:  There are no pleural effusions.  There is no evidence of a pneumothorax.  There is no evidence of pneumomediastinum.  No  airspace opacity is present.  No pulmonary nodule is identified.    The heart is unremarkable there is normal tapering of the abdominal aorta.  The aortic branch vessels are within normal limits.  There is no evidence of lymphadenopathy in the abdomen or pelvis.    The esophagus, stomach, and duodenum are within normal limits.  The small bowel loops are unremarkable.  The appendix is in the midline.  The large bowel loops are unremarkable.    The liver is unremarkable.  The gallbladder is within normal limits.  The biliary tree is unremarkable.  The spleen is enlarged.  The pancreas is within normal limits.  The adrenal glands are unremarkable.    The kidneys are unremarkable.  The ureters are poorly delineated due to paucity of abdominal fat.  The urinary bladder is unremarkable.  The uterus is within normal limits.  There is a 1.7 a simple cyst in the left adnexa.    The psoas margins are unremarkable.  The abdominal wall is within normal limits.  The osseous structures are unremarkable.                                 Medications   ondansetron injection 8 mg (0 mg Intravenous Hold 4/20/22 1730)   ketorolac injection 15 mg (0 mg Intravenous Hold 4/20/22 1730)   sodium chloride 0.9% bolus 1,000 mL (0 mLs Intravenous Stopped 4/20/22 1905)   iohexoL (OMNIPAQUE 350) injection 75 mL (75 mLs Intravenous Given 4/20/22 1900)     Medical Decision Making:   History:   Old Medical Records: I decided to obtain old medical records.  ED Management:  Symptoms have completely resolved.  She remains overall well-appearing.  Repeat abdominal exam benign.  Vital stable.  Workup completely unremarkable.  Specifically no evidence of acute appendicitis, obstruction, or other acute process.  No significant electrolyte or metabolic disturbance.  No acute kidney injury.  No leukocytosis or fever.  No UTI.  No large ovarian mass suggestive of ovarian torsion.  Denies concern for STDs.  Symptoms overall seem more pelvic rather than  abdominal.  Patient would likely benefit from outpatient OBGYN evaluation.  GI may also be helpful as an outpatient.  Advising follow-up with PCP. Strict return precautions discussed.  Agreeable to plan.          Scribe Attestation:   Scribe #1: I performed the above scribed service and the documentation accurately describes the services I performed. I attest to the accuracy of the note.                 Clinical Impression:   Final diagnoses:  [R10.30] Lower abdominal pain (Primary)  [R11.2] Non-intractable vomiting with nausea, unspecified vomiting type          ED Disposition Condition    Discharge Stable        ED Prescriptions     Medication Sig Dispense Start Date End Date Auth. Provider    dicyclomine (BENTYL) 20 mg tablet Take 1 tablet (20 mg total) by mouth 4 (four) times daily. for 3 days 12 tablet 4/20/2022 4/23/2022 Shamir Ervin PA-C    ondansetron (ZOFRAN) 4 MG tablet Take 2 tablets (8 mg total) by mouth every 6 (six) hours as needed for Nausea. 30 tablet 4/20/2022 5/20/2022 Shamir Ervin PA-C    meloxicam (MOBIC) 7.5 MG tablet Take 1 tablet (7.5 mg total) by mouth once daily. 12 tablet 4/20/2022  Shamir Ervin PA-C        Follow-up Information     Follow up With Specialties Details Why Contact [facility] Emergency Dept Emergency Medicine Go to  If symptoms worsen [address] [facility] [address] [phone]    Jose Aguilar MD Pediatrics Schedule an appointment as soon as possible for a visit in 1 day For re-evaluation [facility] [facility] [address] [phone]      Ezra Willis MD Obstetrics and Gynecology Schedule an appointment as soon as possible for a visit in 1 day For further evaluation, For more definitive management of your symptoms [address]  [address]  [facility] [address] [phone]         IShamir PA-C, personally performed the services described in this documentation. All medical record entries made by the scribe were at  my direction and in my presence. I have reviewed the chart and agree that the record reflects my personal performance and is accurate and complete.       Shamir Ervin PA-C  04/20/22 2050

## 2022-04-20 NOTE — ED NOTES
Pt unable to provide urine at this moment. IV fluids infusing.  Pt refuses medication at this time

## 2022-04-20 NOTE — ED TRIAGE NOTES
Pt reports nausea and emesis since Feb.  Reports lower abd/pelvic pain.  Worsened the last week.  Denies urinary symptoms.

## 2022-04-22 LAB
C TRACH DNA SPEC QL NAA+PROBE: NOT DETECTED
N GONORRHOEA DNA SPEC QL NAA+PROBE: NOT DETECTED

## 2022-06-13 ENCOUNTER — HOSPITAL ENCOUNTER (EMERGENCY)
Facility: HOSPITAL | Age: 27
Discharge: HOME | End: 2022-06-13
Attending: EMERGENCY MEDICINE
Payer: MEDICAID

## 2022-06-13 VITALS
WEIGHT: 130 LBS | HEART RATE: 62 BPM | SYSTOLIC BLOOD PRESSURE: 130 MMHG | TEMPERATURE: 98 F | HEIGHT: 61 IN | DIASTOLIC BLOOD PRESSURE: 84 MMHG | RESPIRATION RATE: 18 BRPM | OXYGEN SATURATION: 98 % | BODY MASS INDEX: 24.55 KG/M2

## 2022-06-13 DIAGNOSIS — K29.00 ACUTE GASTRITIS WITHOUT HEMORRHAGE, UNSPECIFIED GASTRITIS TYPE: Primary | ICD-10-CM

## 2022-06-13 LAB
B-HCG UR QL: NEGATIVE
BACTERIA #/AREA URNS HPF: ABNORMAL /HPF
BILIRUB UR QL STRIP: NEGATIVE
CLARITY UR: CLEAR
COLOR UR: ABNORMAL
CTP QC/QA: YES
GLUCOSE UR QL STRIP: NEGATIVE
HGB UR QL STRIP: NEGATIVE
HYALINE CASTS #/AREA URNS LPF: 0 /LPF
KETONES UR QL STRIP: NEGATIVE
LEUKOCYTE ESTERASE UR QL STRIP: ABNORMAL
MICROSCOPIC COMMENT: ABNORMAL
NITRITE UR QL STRIP: NEGATIVE
PH UR STRIP: 7 [PH] (ref 5–8)
PROT UR QL STRIP: ABNORMAL
RBC #/AREA URNS HPF: 2 /HPF (ref 0–4)
SP GR UR STRIP: >1.03 (ref 1–1.03)
SQUAMOUS #/AREA URNS HPF: 8 /HPF
URN SPEC COLLECT METH UR: ABNORMAL
UROBILINOGEN UR STRIP-ACNC: NEGATIVE EU/DL
WBC #/AREA URNS HPF: 5 /HPF (ref 0–5)
WBC CLUMPS URNS QL MICRO: ABNORMAL

## 2022-06-13 PROCEDURE — 93005 ELECTROCARDIOGRAM TRACING: CPT

## 2022-06-13 PROCEDURE — 25000003 PHARM REV CODE 250: Performed by: PHYSICIAN ASSISTANT

## 2022-06-13 PROCEDURE — 81000 URINALYSIS NONAUTO W/SCOPE: CPT | Performed by: PHYSICIAN ASSISTANT

## 2022-06-13 PROCEDURE — 93010 ELECTROCARDIOGRAM REPORT: CPT | Mod: ,,, | Performed by: INTERNAL MEDICINE

## 2022-06-13 PROCEDURE — 81025 URINE PREGNANCY TEST: CPT | Performed by: PHYSICIAN ASSISTANT

## 2022-06-13 PROCEDURE — 93010 EKG 12-LEAD: ICD-10-PCS | Mod: ,,, | Performed by: INTERNAL MEDICINE

## 2022-06-13 PROCEDURE — 99283 EMERGENCY DEPT VISIT LOW MDM: CPT | Mod: 25

## 2022-06-13 RX ORDER — LIDOCAINE HYDROCHLORIDE 20 MG/ML
10 SOLUTION OROPHARYNGEAL ONCE
Status: COMPLETED | OUTPATIENT
Start: 2022-06-13 | End: 2022-06-13

## 2022-06-13 RX ORDER — MAG HYDROX/ALUMINUM HYD/SIMETH 200-200-20
30 SUSPENSION, ORAL (FINAL DOSE FORM) ORAL ONCE
Status: COMPLETED | OUTPATIENT
Start: 2022-06-13 | End: 2022-06-13

## 2022-06-13 RX ORDER — PANTOPRAZOLE SODIUM 20 MG/1
40 TABLET, DELAYED RELEASE ORAL DAILY
Qty: 60 TABLET | Refills: 0 | Status: SHIPPED | OUTPATIENT
Start: 2022-06-13 | End: 2022-07-13

## 2022-06-13 RX ADMIN — LIDOCAINE HYDROCHLORIDE 10 ML: 20 SOLUTION ORAL; TOPICAL at 07:06

## 2022-06-13 RX ADMIN — ALUMINUM HYDROXIDE, MAGNESIUM HYDROXIDE, AND SIMETHICONE 30 ML: 200; 200; 20 SUSPENSION ORAL at 07:06

## 2022-06-13 NOTE — FIRST PROVIDER EVALUATION
"Medical screening exam completed.  I have conducted a focused provider triage encounter, findings are as follows:    Brief history of present illness:  27 y.o. female presents to the ED c/o epigastric abdominal pain for 2 days. Denies n/v/d.     Vitals:    06/13/22 1833   BP: (!) 134/91   BP Location: Right arm   Patient Position: Sitting   Pulse: 60   Resp: 20   Temp: 98.4 °F (36.9 °C)   TempSrc: Oral   SpO2: 97%   Weight: 59 kg (130 lb)   Height: 5' 1" (1.549 m)       Pertinent physical exam:  Patient is nontoxic appearing and in no acute distress.      Brief workup plan:  EKG, GI cocktail    Preliminary workup initiated; this workup will be continued and followed by the physician or advanced practice provider that is assigned to the patient when roomed.  "

## 2022-06-14 NOTE — ED PROVIDER NOTES
Encounter Date: 6/13/2022       History     Chief Complaint   Patient presents with    Abdominal Pain     Pt to ER with c/o epigastric pain x 2 days. Pt denies N/V      27-year-old female with no pertinent past medical history presents to the emergency department for 2 day history of atraumatic burning epigastric abdominal pain that radiates into her chest.  She is unsure if is worse after eating.  Notes belching more than usual.  No history of GERD or gastritis.  No daily NSAID use.  Recent alcohol use.  Denies melena.  Denies nausea, vomiting, fever, and shortness of breath.  No medication prior to arrival.        Review of patient's allergies indicates:  No Known Allergies  No past medical history on file.  No past surgical history on file.  Family History   Problem Relation Age of Onset    Diabetes Mother     Hypertension Father     Migraines Father     Seizures Maternal Grandmother     Diabetes Paternal Grandfather      Social History     Tobacco Use    Smoking status: Never Smoker    Smokeless tobacco: Never Used   Substance Use Topics    Alcohol use: No    Drug use: No     Review of Systems   Constitutional: Negative for fever.   Respiratory: Negative for shortness of breath.    Cardiovascular: Positive for chest pain.   Gastrointestinal: Positive for abdominal pain. Negative for blood in stool, constipation, diarrhea, nausea and vomiting.   Musculoskeletal: Negative for back pain, joint swelling and neck pain.   Skin: Negative for color change and wound.   Neurological: Negative for numbness.   All other systems reviewed and are negative.      Physical Exam     Initial Vitals [06/13/22 1833]   BP Pulse Resp Temp SpO2   (!) 134/91 60 20 98.4 °F (36.9 °C) 97 %      MAP       --         Physical Exam    Nursing note and vitals reviewed.  Constitutional: She appears well-developed and well-nourished. She is not diaphoretic. No distress.   HENT:   Head: Atraumatic.   Right Ear: External ear normal.    Left Ear: External ear normal.   Eyes: Conjunctivae and EOM are normal.   Neck: No tracheal deviation present.   Normal range of motion.  Cardiovascular: Normal rate and regular rhythm.   Pulmonary/Chest: No accessory muscle usage or stridor. No tachypnea. No respiratory distress.   Abdominal: Abdomen is soft. She exhibits no distension. There is no abdominal tenderness.   No right CVA tenderness.  No left CVA tenderness. There is no rebound, no guarding, no tenderness at McBurney's point and negative Barrett's sign. negative Rovsing's sign  Musculoskeletal:         General: No edema. Normal range of motion.      Cervical back: Normal range of motion.     Neurological: She is alert and oriented to person, place, and time. She displays no tremor. She displays no seizure activity. Coordination and gait normal.   Skin: Skin is intact. Capillary refill takes less than 2 seconds. No rash noted. No erythema.         ED Course   Procedures  Labs Reviewed   URINALYSIS, REFLEX TO URINE CULTURE - Abnormal; Notable for the following components:       Result Value    Specific Gravity, UA >1.030 (*)     Protein, UA 1+ (*)     Leukocytes, UA 1+ (*)     All other components within normal limits    Narrative:     Specimen Source->Urine   URINALYSIS MICROSCOPIC - Abnormal; Notable for the following components:    Bacteria Moderate (*)     All other components within normal limits    Narrative:     Specimen Source->Urine   POCT URINE PREGNANCY          Imaging Results    None          Medications   aluminum-magnesium hydroxide-simethicone 200-200-20 mg/5 mL suspension 30 mL (30 mLs Oral Given 6/13/22 1945)     And   LIDOcaine HCl 2% oral solution 10 mL (10 mLs Oral Given 6/13/22 1945)     Medical Decision Making:   History:   Old Medical Records: I decided to obtain old medical records.  ED Management:    This is an emergent evaluation of a 27 y.o. female presenting to the ED for epigastric abdominal pain that is worse after eating.  Denies CP, SOB, emesis, fever, and GI bleeding. Afebrile.     Given trial of GI cocktail in ED with immediate relief of symptoms. Remains well appearing with benign abdominal exam and stable vitals. Tolerating PO without complication.     Presentation most consistent with GERD/gastritis. May be developing PUD. No symptoms of upper GI bleed. I doubt perforated ulcer. I've also carefully considered but doubt acute pancreatitis, acute cholecystitis, pyogenic liver abscess, splenic infarction, esophageal impaction, ACS, pericarditis, aortic dissection, PNA, and PE. No Hx of trauma.     No indication for emergent endoscopy at this time. Sent home with supportive care. We discuss supportive measure and avoidence of exacerbating agents. Advising close follow up with PCP and GI for further evaluation. We discuss strict return precautions, and patient is agreeable to plan.     I discussed with the patient the diagnosis, treatment plan, indications for return to the emergency department, and for expected follow-up. The patient verbalized an understanding. The patient is asked if there are any questions or concerns. We discuss the case, until all issues are addressed to the patients satisfaction. Patient understands and is agreeable to the plan.                       Clinical Impression:   Final diagnoses:  [K29.00] Acute gastritis without hemorrhage, unspecified gastritis type (Primary)          ED Disposition Condition    Discharge Stable        ED Prescriptions     Medication Sig Dispense Start Date End Date Auth. Provider    pantoprazole (PROTONIX) 20 MG tablet Take 2 tablets (40 mg total) by mouth once daily. 60 tablet 6/13/2022 7/13/2022 Shamir Ervin PA-C        Follow-up Information     Follow up With Specialties Details Why Contact Info    Jose Aguilar MD Pediatrics Schedule an appointment as soon as possible for a visit in 1 day For re-evaluation [address] [facility]  [place] [address]  [phone]      [facility]  GASTROENTEROLOGY Gastroenterology Schedule an appointment as soon as possible for a visit in 1 day For further evaluation and more definitive management of your stomach issues [address]  [facility] [address]  [phone]    [facility] Surgical Oncology, Orthopedic Surgery, Genetics, Physical Medicine and Rehabilitation, Occupational Therapy, Radiology Go in 1 day as an alternative to specialist evaluation [address] [address]  [phone]      [facility] Emergency Dept Emergency Medicine Go to  If symptoms worsen [address]  [facility] [address]  [phone]           Shamir Ervin PA-C  06/13/22 2011

## 2022-06-14 NOTE — DISCHARGE INSTRUCTIONS

## 2022-09-12 ENCOUNTER — HOSPITAL ENCOUNTER (EMERGENCY)
Facility: HOSPITAL | Age: 27
Discharge: HOME | End: 2022-09-12
Attending: STUDENT IN AN ORGANIZED HEALTH CARE EDUCATION/TRAINING PROGRAM
Payer: MEDICAID

## 2022-09-12 VITALS
OXYGEN SATURATION: 99 % | HEIGHT: 61 IN | BODY MASS INDEX: 23.98 KG/M2 | TEMPERATURE: 100 F | RESPIRATION RATE: 18 BRPM | WEIGHT: 127 LBS | DIASTOLIC BLOOD PRESSURE: 65 MMHG | SYSTOLIC BLOOD PRESSURE: 110 MMHG | HEART RATE: 86 BPM

## 2022-09-12 DIAGNOSIS — R10.9 ABDOMINAL PAIN, UNSPECIFIED ABDOMINAL LOCATION: Primary | ICD-10-CM

## 2022-09-12 DIAGNOSIS — N39.0 URINARY TRACT INFECTION WITHOUT HEMATURIA, SITE UNSPECIFIED: ICD-10-CM

## 2022-09-12 LAB
ALBUMIN SERPL BCP-MCNC: 3.5 G/DL (ref 3.5–5.2)
ALP SERPL-CCNC: 60 U/L (ref 55–135)
ALT SERPL W/O P-5'-P-CCNC: 13 U/L (ref 10–44)
ANION GAP SERPL CALC-SCNC: 12 MMOL/L (ref 8–16)
AST SERPL-CCNC: 16 U/L (ref 10–40)
B-HCG UR QL: NEGATIVE
BACTERIA #/AREA URNS HPF: ABNORMAL /HPF
BASOPHILS # BLD AUTO: 0.01 K/UL (ref 0–0.2)
BASOPHILS NFR BLD: 0.3 % (ref 0–1.9)
BILIRUB SERPL-MCNC: 0.5 MG/DL (ref 0.1–1)
BILIRUB UR QL STRIP: ABNORMAL
BUN SERPL-MCNC: 11 MG/DL (ref 6–20)
CALCIUM SERPL-MCNC: 9.1 MG/DL (ref 8.7–10.5)
CAOX CRY URNS QL MICRO: ABNORMAL
CHLORIDE SERPL-SCNC: 104 MMOL/L (ref 95–110)
CLARITY UR: ABNORMAL
CO2 SERPL-SCNC: 21 MMOL/L (ref 23–29)
COLOR UR: YELLOW
CREAT SERPL-MCNC: 0.9 MG/DL (ref 0.5–1.4)
CTP QC/QA: YES
DIFFERENTIAL METHOD: ABNORMAL
EOSINOPHIL # BLD AUTO: 0 K/UL (ref 0–0.5)
EOSINOPHIL NFR BLD: 0.3 % (ref 0–8)
ERYTHROCYTE [DISTWIDTH] IN BLOOD BY AUTOMATED COUNT: 13.2 % (ref 11.5–14.5)
EST. GFR  (NO RACE VARIABLE): >60 ML/MIN/1.73 M^2
GLUCOSE SERPL-MCNC: 83 MG/DL (ref 70–110)
GLUCOSE UR QL STRIP: NEGATIVE
HCT VFR BLD AUTO: 35.9 % (ref 37–48.5)
HGB BLD-MCNC: 11.8 G/DL (ref 12–16)
HGB UR QL STRIP: NEGATIVE
HYALINE CASTS #/AREA URNS LPF: 0 /LPF
IMM GRANULOCYTES # BLD AUTO: 0.01 K/UL (ref 0–0.04)
IMM GRANULOCYTES NFR BLD AUTO: 0.3 % (ref 0–0.5)
KETONES UR QL STRIP: ABNORMAL
LEUKOCYTE ESTERASE UR QL STRIP: ABNORMAL
LIPASE SERPL-CCNC: 19 U/L (ref 4–60)
LYMPHOCYTES # BLD AUTO: 0.6 K/UL (ref 1–4.8)
LYMPHOCYTES NFR BLD: 18.4 % (ref 18–48)
MCH RBC QN AUTO: 28.2 PG (ref 27–31)
MCHC RBC AUTO-ENTMCNC: 32.9 G/DL (ref 32–36)
MCV RBC AUTO: 86 FL (ref 82–98)
MICROSCOPIC COMMENT: ABNORMAL
MONOCYTES # BLD AUTO: 0.5 K/UL (ref 0.3–1)
MONOCYTES NFR BLD: 15.7 % (ref 4–15)
NEUTROPHILS # BLD AUTO: 2 K/UL (ref 1.8–7.7)
NEUTROPHILS NFR BLD: 65 % (ref 38–73)
NITRITE UR QL STRIP: NEGATIVE
NRBC BLD-RTO: 0 /100 WBC
PH UR STRIP: 6 [PH] (ref 5–8)
PLATELET # BLD AUTO: 177 K/UL (ref 150–450)
PMV BLD AUTO: 11 FL (ref 9.2–12.9)
POTASSIUM SERPL-SCNC: 3.6 MMOL/L (ref 3.5–5.1)
PROT SERPL-MCNC: 7 G/DL (ref 6–8.4)
PROT UR QL STRIP: ABNORMAL
RBC # BLD AUTO: 4.19 M/UL (ref 4–5.4)
RBC #/AREA URNS HPF: 1 /HPF (ref 0–4)
SODIUM SERPL-SCNC: 137 MMOL/L (ref 136–145)
SP GR UR STRIP: >1.03 (ref 1–1.03)
SQUAMOUS #/AREA URNS HPF: 2 /HPF
URN SPEC COLLECT METH UR: ABNORMAL
UROBILINOGEN UR STRIP-ACNC: ABNORMAL EU/DL
WBC # BLD AUTO: 3.05 K/UL (ref 3.9–12.7)
WBC #/AREA URNS HPF: 1 /HPF (ref 0–5)

## 2022-09-12 PROCEDURE — 83690 ASSAY OF LIPASE: CPT | Performed by: PHYSICIAN ASSISTANT

## 2022-09-12 PROCEDURE — 81000 URINALYSIS NONAUTO W/SCOPE: CPT | Performed by: PHYSICIAN ASSISTANT

## 2022-09-12 PROCEDURE — 85025 COMPLETE CBC W/AUTO DIFF WBC: CPT | Performed by: PHYSICIAN ASSISTANT

## 2022-09-12 PROCEDURE — 99283 EMERGENCY DEPT VISIT LOW MDM: CPT

## 2022-09-12 PROCEDURE — 80053 COMPREHEN METABOLIC PANEL: CPT | Performed by: PHYSICIAN ASSISTANT

## 2022-09-12 PROCEDURE — 25000003 PHARM REV CODE 250: Performed by: STUDENT IN AN ORGANIZED HEALTH CARE EDUCATION/TRAINING PROGRAM

## 2022-09-12 RX ORDER — DOXYCYCLINE 100 MG/1
100 CAPSULE ORAL 2 TIMES DAILY
Qty: 20 CAPSULE | Refills: 0 | Status: SHIPPED | OUTPATIENT
Start: 2022-09-12 | End: 2022-09-22

## 2022-09-12 RX ORDER — NITROFURANTOIN 25; 75 MG/1; MG/1
100 CAPSULE ORAL EVERY 12 HOURS
Status: DISCONTINUED | OUTPATIENT
Start: 2022-09-12 | End: 2022-09-12

## 2022-09-12 RX ORDER — NITROFURANTOIN 25; 75 MG/1; MG/1
100 CAPSULE ORAL EVERY 12 HOURS
Status: COMPLETED | OUTPATIENT
Start: 2022-09-12 | End: 2022-09-12

## 2022-09-12 RX ADMIN — NITROFURANTOIN (MONOHYDRATE/MACROCRYSTALS) 100 MG: 75; 25 CAPSULE ORAL at 09:09

## 2022-09-12 NOTE — FIRST PROVIDER EVALUATION
Emergency Department TeleTriage Encounter Note      CHIEF COMPLAINT    Chief Complaint   Patient presents with    Abdominal Pain     Pt states that she started to have lower abdominal pain that started last week with nausea and diarrhea, pain has become worse today and is now moving to her back. She was seen at PCP today for same complaint and given bentyl but she hasn't taken it yet because she was told to take it at night. Pt abdomen slightly distended and firm upon palpation and she withdrawals to touch.        VITAL SIGNS   Initial Vitals [09/12/22 1835]   BP Pulse Resp Temp SpO2   116/70 108 18 99.5 °F (37.5 °C) 99 %      MAP       --            ALLERGIES    Review of patient's allergies indicates:  No Known Allergies    PROVIDER TRIAGE NOTE  This is a teletriage evaluation of a 27 y.o. female presenting to the ED complaining of abdominal pain. Patient reports lower abdominal pain for 1 week. Radiates into her back. She denies other symptoms.     Initial orders will be placed and care will be transferred to an alternate provider when patient is roomed for a full evaluation. Any additional orders and the final disposition will be determined by that provider.         ORDERS  Labs Reviewed   CBC W/ AUTO DIFFERENTIAL   COMPREHENSIVE METABOLIC PANEL   LIPASE   URINALYSIS, REFLEX TO URINE CULTURE       ED Orders (720h ago, onward)      Start Ordered     Status Ordering Provider    09/12/22 1858 09/12/22 1857  Vital signs  Every 2 hours         Ordered NITZA RUSSELL    09/12/22 1858 09/12/22 1857  Diet NPO  Diet effective now         Ordered NITZA RUSSELL.    09/12/22 1858 09/12/22 1857  Insert peripheral IV  Once         Ordered NITZA RUSSELL    09/12/22 1858 09/12/22 1857  CBC W/ AUTO DIFFERENTIAL  STAT         Ordered DREWNITZA MONK.    09/12/22 1858 09/12/22 1857  Comp. Metabolic Panel  STAT         Ordered DREWNITZA MONK    09/12/22 1858 09/12/22 1857  Lipase  STAT         Ordered NITZA RUSSELL.     09/12/22 1858 09/12/22 1857  Urinalysis, Reflex to Urine Culture Urine, Clean Catch  STAT         Ordered NITZA RUSSELL              Virtual Visit Note: The provider triage portion of this emergency department evaluation and documentation was performed via [employer], a HIPAA-compliant telemedicine application, in concert with a tele-presenter in the room. A face to face patient evaluation with one of my colleagues will occur once the patient is placed in an emergency department room.      DISCLAIMER: This note was prepared with [employer] voice recognition transcription software. Garbled syntax, mangled pronouns, and other bizarre constructions may be attributed to that software system.

## 2022-09-13 NOTE — ED PROVIDER NOTES
Encounter Date: 9/12/2022       History     Chief Complaint   Patient presents with    Abdominal Pain     Pt states that she started to have lower abdominal pain that started last week with nausea and diarrhea, pain has become worse today and is now moving to her back. She was seen at PCP today for same complaint and given bentyl but she hasn't taken it yet because she was told to take it at night. Pt abdomen slightly distended and firm upon palpation and she withdrawals to touch.      27-year-old female presents for evaluation of intermittent lower abdominal pain, crampy in nature, ongoing for the past week associated with diarrhea, intermittently moving to the back.  Episodes last about an hour and resolve on their own.  She denies nausea or vomiting.  She is having normal bowel movements for her which is every other day.  She denies dysuria hematuria urgency or frequency.    Review of patient's allergies indicates:  No Known Allergies  No past medical history on file.  No past surgical history on file.  Family History   Problem Relation Age of Onset    Diabetes Mother     Hypertension Father     Migraines Father     Seizures Maternal Grandmother     Diabetes Paternal Grandfather      Social History     Tobacco Use    Smoking status: Never    Smokeless tobacco: Never   Substance Use Topics    Alcohol use: No    Drug use: No     Review of Systems   Constitutional:  Negative for fever.   HENT:  Negative for sore throat.    Respiratory:  Negative for shortness of breath.    Cardiovascular:  Negative for chest pain.   Gastrointestinal:  Positive for abdominal pain. Negative for nausea.   Genitourinary:  Positive for pelvic pain. Negative for dysuria, hematuria, vaginal bleeding and vaginal discharge.   Musculoskeletal:  Negative for back pain.   Skin:  Negative for rash.   Neurological:  Negative for weakness.   Hematological:  Does not bruise/bleed easily.     Physical Exam     Initial Vitals [09/12/22 1835]   BP  Pulse Resp Temp SpO2   116/70 108 18 99.5 °F (37.5 °C) 99 %      MAP       --         Physical Exam    Nursing note and vitals reviewed.  Constitutional: She appears well-developed and well-nourished. She is not diaphoretic.   HENT:   Head: Normocephalic and atraumatic.   Mouth/Throat: Oropharynx is clear and moist.   Eyes: EOM are normal. Pupils are equal, round, and reactive to light. Right eye exhibits no discharge. Left eye exhibits no discharge.   Neck: No tracheal deviation present.   Normal range of motion.  Cardiovascular:  Normal rate, regular rhythm and intact distal pulses.           Pulmonary/Chest: No respiratory distress. She has no wheezes. She exhibits no tenderness.   Abdominal: Abdomen is soft. She exhibits no distension. There is no abdominal tenderness.   Musculoskeletal:         General: No tenderness or edema. Normal range of motion.      Cervical back: Normal range of motion.     Neurological: She is alert and oriented to person, place, and time. She has normal strength. No cranial nerve deficit or sensory deficit. GCS eye subscore is 4. GCS verbal subscore is 5. GCS motor subscore is 6.   Skin: Skin is warm and dry. No rash noted.   Psychiatric: She has a normal mood and affect. Her behavior is normal. Thought content normal.       ED Course   Procedures  Labs Reviewed   CBC W/ AUTO DIFFERENTIAL - Abnormal; Notable for the following components:       Result Value    WBC 3.05 (*)     Hemoglobin 11.8 (*)     Hematocrit 35.9 (*)     Lymph # 0.6 (*)     Mono % 15.7 (*)     All other components within normal limits   COMPREHENSIVE METABOLIC PANEL - Abnormal; Notable for the following components:    CO2 21 (*)     All other components within normal limits   URINALYSIS, REFLEX TO URINE CULTURE - Abnormal; Notable for the following components:    Appearance, UA Hazy (*)     Specific Gravity, UA >1.030 (*)     Protein, UA 1+ (*)     Ketones, UA Trace (*)     Bilirubin (UA) 1+ (*)     Urobilinogen, UA  2.0-3.0 (*)     Leukocytes, UA 2+ (*)     All other components within normal limits    Narrative:     Specimen Source->Urine   URINALYSIS MICROSCOPIC - Abnormal; Notable for the following components:    Bacteria Few (*)     All other components within normal limits    Narrative:     Specimen Source->Urine   LIPASE          Imaging Results    None          Medications   nitrofurantoin (macrocrystal-monohydrate) 100 MG capsule 100 mg (100 mg Oral Given 9/12/22 2100)     Medical Decision Making:   Initial Assessment:   28 y/o female presents for lower abdominal pain. Vitals normal. My exam notable for soft, non-tender abdomen without rebound or guarding. Pain had resolved on my examination.   Differential Diagnosis:   Pregnancy, ectopic, UTI, pyelonephritis, Torsion, intussusception, SBO, endometriosis  ED Management:    CBC and CMP WNL. UA concerning for UTI, will treat. Patient feeling well on my exam without medication for pain. She has a soft, non-tender abdomen without rebound or guarding, lessening concern for acute intra-abdominal process. I offered an US to assess for ovarian torsion, but the patient elected against it because she's feeling better. She has close outpatient follow-up with OB-gyn for similar intermittent abdominal pain episodes. Possible etiologies include ovarian cyst w/ rupture, endometriosis, fibroids, torsion. She also has follow-up with GI. She's tolerating PO intake and having normal BM. Stable for discharge with outpatient follow-up and return precautions           ED Course as of 09/13/22 0954   Mon Sep 12, 2022   2049 Urinalysis, Reflex to Urine Culture Urine, Clean Catch(!) [BS]   2049 Urinalysis Microscopic(!) [BS]   2050 UA consistent with UTI. Will treat with macrodantin. [BS]      ED Course User Index  [BS] Bhavik Kurtz MD                 Clinical Impression:   Final diagnoses:  [R10.9] Abdominal pain, unspecified abdominal location (Primary)  [N39.0] Urinary tract infection  without hematuria, site unspecified      ED Disposition Condition    Discharge Stable          ED Prescriptions       Medication Sig Dispense Start Date End Date Auth. Provider    doxycycline (VIBRAMYCIN) 100 MG Cap Take 1 capsule (100 mg total) by mouth 2 (two) times daily. for 10 days 20 capsule 9/12/2022 9/22/2022 Bhavik Kurtz MD          Follow-up Information       Follow up With Specialties Details Why Contact Info    Jose Aguilar MD Pediatrics Call in 2 days To recheck today's symptoms [address] [facility] [address]  [phone]               Bhavik Kurtz MD  09/13/22 0125       Bhavik Kurtz MD  09/13/22 0931

## 2022-09-13 NOTE — DISCHARGE INSTRUCTIONS
Call your OB to set up follow-up in a few days to discuss further workup for your intermittent abdominal pain. Return if you have worse abdominal pain, nausea or vomiting.     Thank you for coming to our Emergency Department today. It is important to remember that some problems or medical conditions are difficult to diagnose and may not be found during your Emergency Department visit.     Be sure to follow up with your primary care doctor and review all labs/imaging/tests that were performed during your ER visit with them. Some labs/tests may be outside of the normal range and require non-emergent follow-up and further investigation to help diagnose/exclude/prevent complications or other potentially serious medical conditions that were not addressed during your ER visit.    If you do not have a primary care doctor, you may contact the one listed on your discharge paperwork or you may also call the [facility] Appointment Desk at [phone] to schedule an appointment and establish care with one. Another resources for finding primary care physicians: [email or web address] It is important to your health that you have a primary care doctor.    Please take all medications as directed. All medications may potentially have side-effects and it is impossible to predict which medications may give you side-effects or what side-effects (if any) they will give you. If you feel that you are having a negative effect or side-effect of any medication you should immediately stop taking them and seek medical attention. If you feel that you are having a life-threatening reaction call 911.    Return to the ER with any questions/concerns, new/concerning symptoms, worsening or failure to improve.     Do not drive, swim, climb to height, take a bath, operate heavy machinery, drink alcohol or take potentially sedating medications, sign any legal documents or make any important decisions for 24 hours if you have received any pain  medications, sedatives or mood altering drugs during your ER visit or within 24 hours of taking them if they have been prescribed to you.     You can find additional resources for Dentists, hearing aids, durable medical equipment, low cost pharmacies and other resources at [email or web address]

## 2022-09-13 NOTE — ED TRIAGE NOTES
28 yo female presents to ED, escorted by her mother, with c/o abdominal pain. Pt reports that she began to experience symptoms last week accompanied with nausea.Pt reports LBM on today, LMP 8/28. Pt describes the pain as sharp and intermittent. Pt rates pain at a 0 now but when pain hit its a 10/10.